# Patient Record
Sex: FEMALE | Race: WHITE | NOT HISPANIC OR LATINO | Employment: OTHER | URBAN - METROPOLITAN AREA
[De-identification: names, ages, dates, MRNs, and addresses within clinical notes are randomized per-mention and may not be internally consistent; named-entity substitution may affect disease eponyms.]

---

## 2019-11-22 ENCOUNTER — HOSPITAL ENCOUNTER (OUTPATIENT)
Dept: LABOR AND DELIVERY | Facility: HOSPITAL | Age: 24
Discharge: HOME | End: 2019-11-22
Attending: OBSTETRICS & GYNECOLOGY

## 2019-11-22 LAB
ALBUMIN SERPL-MCNC: 3.5 G/DL (ref 3.5–5)
ALBUMIN/GLOB SERPL: 1.1 {RATIO} (ref 1.4–2.6)
ALP SERPL-CCNC: 123 U/L (ref 42–98)
ALT SERPL-CCNC: 17 U/L (ref 10–40)
ANION GAP SERPL CALC-SCNC: 18 MMOL/L (ref 8–19)
AST SERPL-CCNC: 20 U/L (ref 15–50)
BASOPHILS # BLD AUTO: 0.02 10*3/UL (ref 0–0.2)
BASOPHILS NFR BLD AUTO: 0.2 % (ref 0–3)
BILIRUB SERPL-MCNC: <0.15 MG/DL (ref 0.2–1.3)
BUN SERPL-MCNC: 11 MG/DL (ref 5–25)
BUN/CREAT SERPL: 14 {RATIO} (ref 6–20)
CALCIUM SERPL-MCNC: 10 MG/DL (ref 8.7–10.4)
CHLORIDE SERPL-SCNC: 101 MMOL/L (ref 99–111)
CONV ABS IMM GRAN: 0.08 10*3/UL (ref 0–0.2)
CONV CO2: 19 MMOL/L (ref 22–32)
CONV CREATININE URINE, RANDOM: 110.1 MG/DL (ref 10–300)
CONV IMMATURE GRAN: 0.7 % (ref 0–1.8)
CONV PROTEIN TO CREATININE RATIO (RANDOM URINE): 0.13 {RATIO} (ref 0–0.1)
CONV TOTAL PROTEIN: 6.6 G/DL (ref 6.3–8.2)
CREAT UR-MCNC: 0.79 MG/DL (ref 0.5–0.9)
DEPRECATED RDW RBC AUTO: 39.9 FL (ref 36.4–46.3)
EOSINOPHIL # BLD AUTO: 0.07 10*3/UL (ref 0–0.7)
EOSINOPHIL # BLD AUTO: 0.6 % (ref 0–7)
ERYTHROCYTE [DISTWIDTH] IN BLOOD BY AUTOMATED COUNT: 12.9 % (ref 11.7–14.4)
GFR SERPLBLD BASED ON 1.73 SQ M-ARVRAT: >60 ML/MIN/{1.73_M2}
GLOBULIN UR ELPH-MCNC: 3.1 G/DL (ref 2–3.5)
GLUCOSE SERPL-MCNC: 98 MG/DL (ref 65–99)
HCT VFR BLD AUTO: 36.8 % (ref 37–47)
HGB BLD-MCNC: 12.6 G/DL (ref 12–16)
LYMPHOCYTES # BLD AUTO: 2.22 10*3/UL (ref 1–5)
LYMPHOCYTES NFR BLD AUTO: 20.1 % (ref 20–45)
MCH RBC QN AUTO: 29.6 PG (ref 27–31)
MCHC RBC AUTO-ENTMCNC: 34.2 G/DL (ref 33–37)
MCV RBC AUTO: 86.4 FL (ref 81–99)
MONOCYTES # BLD AUTO: 0.73 10*3/UL (ref 0.2–1.2)
MONOCYTES NFR BLD AUTO: 6.6 % (ref 3–10)
NEUTROPHILS # BLD AUTO: 7.92 10*3/UL (ref 2–8)
NEUTROPHILS NFR BLD AUTO: 71.8 % (ref 30–85)
NRBC CBCN: 0 % (ref 0–0.7)
OSMOLALITY SERPL CALC.SUM OF ELEC: 277 MOSM/KG (ref 273–304)
PLATELET # BLD AUTO: 261 10*3/UL (ref 130–400)
PMV BLD AUTO: 9.3 FL (ref 9.4–12.3)
POTASSIUM SERPL-SCNC: 4.4 MMOL/L (ref 3.5–5.3)
PROT UR-MCNC: 14 MG/DL
RBC # BLD AUTO: 4.26 10*6/UL (ref 4.2–5.4)
SODIUM SERPL-SCNC: 134 MMOL/L (ref 135–147)
WBC # BLD AUTO: 11.04 10*3/UL (ref 4.8–10.8)

## 2021-02-08 LAB
BACTERIA SPEC AEROBE CULT: NO GROWTH
C TRACH RRNA SPEC DONR QL NAA+PROBE: NEGATIVE
EXTERNAL ABO GROUPING: NORMAL
EXTERNAL ANTIBODY SCREEN: NORMAL
EXTERNAL HEMATOCRIT: 38 %
EXTERNAL HEMOGLOBIN: 13.1 G/DL
EXTERNAL HEPATITIS B SURFACE ANTIGEN: NEGATIVE
EXTERNAL PLATELET COUNT: 335 K/ΜL
EXTERNAL RH FACTOR: POSITIVE
EXTERNAL SYPHILIS RPR SCREEN: NEGATIVE
HCV AB S/CO SERPL IA: NEGATIVE
HIV 1+2 AB+HIV1 P24 AG SERPL QL IA: NEGATIVE
N GONORRHOEA DNA SPEC QL NAA+PROBE: NEGATIVE
RUBV IGG SERPL IA-ACNC: NORMAL

## 2021-03-17 ENCOUNTER — HOSPITAL ENCOUNTER (OUTPATIENT)
Dept: VACCINE CLINIC | Facility: HOSPITAL | Age: 26
Discharge: HOME | End: 2021-03-17
Attending: INTERNAL MEDICINE

## 2021-04-08 ENCOUNTER — HOSPITAL ENCOUNTER (OUTPATIENT)
Dept: VACCINE CLINIC | Facility: HOSPITAL | Age: 26
Discharge: HOME | End: 2021-04-08
Attending: INTERNAL MEDICINE

## 2021-06-21 LAB
EXTERNAL HEMATOCRIT: 36 %
EXTERNAL HEMOGLOBIN: 12.1 G/DL
GLUCOSE 1H P 100 G GLC PO SERPL-MCNC: 100 MG/DL (ref 74–180)

## 2021-08-02 ENCOUNTER — HOSPITAL ENCOUNTER (OUTPATIENT)
Facility: HOSPITAL | Age: 26
Discharge: HOME | End: 2021-08-02
Attending: OBSTETRICS & GYNECOLOGY | Admitting: OBSTETRICS & GYNECOLOGY

## 2021-08-02 VITALS
DIASTOLIC BLOOD PRESSURE: 73 MMHG | RESPIRATION RATE: 16 BRPM | HEART RATE: 83 BPM | BODY MASS INDEX: 28.47 KG/M2 | SYSTOLIC BLOOD PRESSURE: 105 MMHG | WEIGHT: 145 LBS | TEMPERATURE: 98.1 F | HEIGHT: 60 IN

## 2021-08-02 LAB
BILIRUB BLD-MCNC: NEGATIVE MG/DL
BLOODY SPECIMEN?: NO
CLARITY, POC: CLEAR
COLOR UR: YELLOW
FIBRONECTIN FETAL VAG QL: NEGATIVE
GLUCOSE UR STRIP-MCNC: NEGATIVE MG/DL
KETONES UR QL: NEGATIVE
LEUKOCYTE EST, POC: NEGATIVE
NITRITE UR-MCNC: NEGATIVE MG/ML
PH UR: 7 [PH] (ref 5–8)
PROT UR STRIP-MCNC: NEGATIVE MG/DL
RBC # UR STRIP: NEGATIVE /UL
SP GR UR: 1.02 (ref 1–1.03)
UROBILINOGEN UR QL: NORMAL

## 2021-08-02 PROCEDURE — 82731 ASSAY OF FETAL FIBRONECTIN: CPT | Performed by: OBSTETRICS & GYNECOLOGY

## 2021-08-02 PROCEDURE — G0463 HOSPITAL OUTPT CLINIC VISIT: HCPCS

## 2021-08-02 PROCEDURE — 81002 URINALYSIS NONAUTO W/O SCOPE: CPT | Performed by: OBSTETRICS & GYNECOLOGY

## 2021-08-02 PROCEDURE — 59025 FETAL NON-STRESS TEST: CPT

## 2021-08-02 RX ORDER — ONDANSETRON 2 MG/ML
4 INJECTION INTRAMUSCULAR; INTRAVENOUS EVERY 6 HOURS PRN
Status: DISCONTINUED | OUTPATIENT
Start: 2021-08-02 | End: 2021-08-02 | Stop reason: HOSPADM

## 2021-08-02 RX ORDER — CETIRIZINE HYDROCHLORIDE 10 MG/1
10 TABLET ORAL DAILY
COMMUNITY

## 2021-08-02 RX ORDER — ACETAMINOPHEN 325 MG/1
650 TABLET ORAL EVERY 6 HOURS PRN
Status: DISCONTINUED | OUTPATIENT
Start: 2021-08-02 | End: 2021-08-02 | Stop reason: HOSPADM

## 2021-08-02 NOTE — NURSING NOTE
Discharge instructions provided to patient. All questions answered, xuan tyson explained. Pt states she has no more questions at this time and verbalizes knowledge of discharge teaching.

## 2021-08-02 NOTE — SIGNIFICANT NOTE
08/02/21 1201   Vital Signs   Temp 98.1 °F (36.7 °C)   Temp src Oral   Heart Rate 86   Heart Rate Source Monitor;Apical   Resp 16   Resp Rate Source Visual   /64   BP Location Right arm   BP Method Automatic   Uterine Activity Assessment   Method external tocotransducer   Contraction Pattern irritability   Uterine Resting Tone soft by palpation   Uterine Tenderness No   Fetal Assessment   Fetal Movement active   Fetal HR Assessment Method external   Fetal HR (beats/min) 145   Fetal Heart Baseline Rate normal range   Fetal HR Variability moderate (amplitude range 6 to 25 bpm)   Fetal HR Accelerations greater than/equal to 15 bpm;lasting at least 15 seconds   Fetal HR Decelerations absent

## 2021-08-02 NOTE — NON STRESS TEST
"ROGELIO Gillespie   OB NST Note    2021   Name:  Marisol Ku  MRN: [id number]    Subjective:  26 y.o.  at 32w5d    Indication: Decreased FM    NST:   Baseline: 130  Variability:   Moderate/Normal (amplitude 6-25 bpm)  Accelerations: Present (32 weeks+) 15 x 15 bpm  Decelerations: Absent   Contractions:  Irritability    NST interpretation: reactive    Documented Vitals    21 1135 21 1150 21 1201 21 1215   BP:  109/64 111/64 107/66   Pulse: 107 107 86 96   Resp: 18      Temp: 98.5 °F (36.9 °C)      Weight: 65.8 kg (145 lb)      Height: 152.4 cm (60\")            Lab Results (last 24 hours)     Procedure Component Value Units Date/Time    Fetal Fibronectin - Vaginal Fluid, [[id number]] Collected: 21 1309    Specimen: Vaginal Fluid Updated: 21 1354     Fetal Fibronectin Negative     Bloody Specimen? No    Narrative:      INTERPRETATION                 Negative: <50 ng/mL  Positive: >or= 50 ng/mL  Invalid: Possible sample integrity compromised. Test was           repeated.  -----------------------------------------------------------   LIMITATIONS  A fFN Sample SHOULD NOT be sent to the lab if any of the  following conditions are present in symptomatic patients:  -Advanced Cervical Dilation (>3 cm)                         -Rupture of Amniotic Membranes  -Moderate or Gross Vaginal Bleeding (May Cause False Positive)             Cervical Exam: Closed, thick, and high per nursing exam    Assessment:  26 y.o.  AT 32w5d  Decreased FM   Uterine irritability    Plan:   OB Precautions, FKC, Keep office visit, Reassurance   The patient was not feeling her contractions.  But given the persistence of the uterine irritability and exam and fetal fibronectin were performed.  The cervix was closed and a fetal fibronectin was negative.  The patient had a reactive NST and was feeling fetal movements prior to discharge home.    Electronically signed by Morgan Noel MD, 21, " 12:57 PM EDT.

## 2021-08-26 ENCOUNTER — HOSPITAL ENCOUNTER (OUTPATIENT)
Facility: HOSPITAL | Age: 26
Discharge: HOME | End: 2021-08-26
Attending: OBSTETRICS & GYNECOLOGY | Admitting: OBSTETRICS & GYNECOLOGY

## 2021-08-26 ENCOUNTER — HOSPITAL ENCOUNTER (OUTPATIENT)
Facility: HOSPITAL | Age: 26
End: 2021-08-26
Attending: OBSTETRICS & GYNECOLOGY | Admitting: OBSTETRICS & GYNECOLOGY

## 2021-08-26 VITALS
BODY MASS INDEX: 28.66 KG/M2 | HEART RATE: 90 BPM | OXYGEN SATURATION: 100 % | SYSTOLIC BLOOD PRESSURE: 119 MMHG | WEIGHT: 146 LBS | RESPIRATION RATE: 18 BRPM | DIASTOLIC BLOOD PRESSURE: 67 MMHG | HEIGHT: 60 IN | TEMPERATURE: 98.3 F

## 2021-08-26 LAB
A1 MICROGLOB PLACENTAL VAG QL: NEGATIVE
BILIRUB BLD-MCNC: ABNORMAL MG/DL
CLARITY, POC: CLEAR
COLOR UR: ABNORMAL
GLUCOSE UR STRIP-MCNC: NEGATIVE MG/DL
KETONES UR QL: ABNORMAL
LEUKOCYTE EST, POC: NEGATIVE
NITRITE UR-MCNC: NEGATIVE MG/ML
PH UR: 6 [PH] (ref 5–8)
PROT UR STRIP-MCNC: ABNORMAL MG/DL
RBC # UR STRIP: NEGATIVE /UL
SP GR UR: 1.02 (ref 1–1.03)
UROBILINOGEN UR QL: NORMAL

## 2021-08-26 PROCEDURE — 59025 FETAL NON-STRESS TEST: CPT

## 2021-08-26 PROCEDURE — G0463 HOSPITAL OUTPT CLINIC VISIT: HCPCS

## 2021-08-26 PROCEDURE — 81002 URINALYSIS NONAUTO W/O SCOPE: CPT | Performed by: OBSTETRICS & GYNECOLOGY

## 2021-08-26 PROCEDURE — 84112 EVAL AMNIOTIC FLUID PROTEIN: CPT | Performed by: OBSTETRICS & GYNECOLOGY

## 2021-08-26 RX ORDER — SODIUM CHLORIDE, SODIUM LACTATE, POTASSIUM CHLORIDE, CALCIUM CHLORIDE 600; 310; 30; 20 MG/100ML; MG/100ML; MG/100ML; MG/100ML
999 INJECTION, SOLUTION INTRAVENOUS CONTINUOUS
Status: DISCONTINUED | OUTPATIENT
Start: 2021-08-26 | End: 2021-08-27 | Stop reason: HOSPADM

## 2021-08-26 RX ORDER — PRENATAL VIT NO.126/IRON/FOLIC 28MG-0.8MG
1 TABLET ORAL
Status: ON HOLD | COMMUNITY
End: 2021-09-15

## 2021-08-26 RX ORDER — ASPIRIN 81 MG/1
81 TABLET ORAL DAILY
COMMUNITY
End: 2021-09-14

## 2021-08-27 NOTE — SIGNIFICANT NOTE
08/26/21 2230   Nonstress Test   Reason for NST OB Triage   Acoustic Stimulator No   Uterine Irritability Yes   Contractions Irregular   Fetal Assessment   Fetal Movement active   Fetal HR Assessment Method external   Interpretation A   Nonstress Test Interpretation A Reactive

## 2021-08-27 NOTE — NURSING NOTE
give update via voalte, sve unchanged still 2/70/-1 posterior, ctx: have spaced out, of dip urine results, orders received to d/c home with PTL precautions.

## 2021-08-27 NOTE — NURSING NOTE
Pt. D/c home in stable condition, PTL precautions, home and follow up care instructions given, pt. Voiced understanding.

## 2021-08-27 NOTE — NON STRESS TEST
" Gillespie   OB NST Note    2021   Name:  Marisol Ku  MRN: [id number]    Subjective:  26 y.o.  at 36w1d    Indication: Contractions    NST:   Baseline: 140  Variability:   Moderate/Normal (amplitude 6-25 bpm)  Accelerations: Present (32 weeks+) 15 x 15 bpm  Decelerations: Absent   Contractions:  Regular    NST interpretation: reactive    Documented Vitals    21   BP: 128/73 117/75 118/73 119/67   Pulse: 114 95 87 90   Resp:  18   Temp: 98.3 °F (36.8 °C)      SpO2: 100%      Weight: 66.2 kg (146 lb)      Height: 152.4 cm (60\")            Lab Results (last 24 hours)     Procedure Component Value Units Date/Time    Rapid Assay For ROM - Amniotic Fluid, Amniotic Sac [[id number]]  (Normal) Collected: 21    Specimen: Amniotic Fluid from Amniotic Sac Updated: 21     Rupture of Membranes Negative    Narrative:      This test detects tiny amounts of amniotic fluid and is  approved for use at any gestational age. When there is   significant presence of blood on the swab, the test can   malfunction and is not recommended (possible false positive  results). In cases of only trace amounts of blood on the swab,  the test still functions properly and will not interfere with  the test results. In very rare cases when a sample is taken  12 hours or later after a rupture, a false negative result may  occur due to obstruction of the rupture by fetus or resealing  of the amniotic sac.    POC Urinalysis Dipstick [[id number]]  (Abnormal) Collected: 21    Specimen: Urine Updated: 21     Color Dark Yellow     Clarity, UA Clear     Glucose, UA Negative mg/dL      Bilirubin Small (1+)     Ketones, UA Trace     Specific Gravity  1.025     Blood, UA Negative     pH, Urine 6.0     Protein, POC Trace mg/dL      Urobilinogen, UA Normal     Leukocytes Negative     Nitrite, UA Negative           Cervical Exam: /-1 per nursing " exam. No cervical change after triage observation    Assessment:  26 y.o. [id number] at 36w1d  Contractions    Plan:   OB Precautions, FKC   The patient was discharged with a reactive NST, reassuring vitals, and no cervical change after several hours of observation. She was given return instructions and urged to keep her office appointment      Electronically signed by Morgan Noel MD, 08/26/21, 11:40 PM EDT.

## 2021-08-27 NOTE — NURSING NOTE
give report of  36.1 weeks presented with c/o ctx:, and leaking fluid since yesterday, of regular ctx: noted on monitor, amnisure obtained, sve /-1, fhr 135, b/p's wnl since admission, hx: preeclampsia with last pregnancy, orders received to obtain dip urine results, start IV, Give LR bolus, recheck cervix after fluids infused, notify with results.

## 2021-08-27 NOTE — NURSING NOTE
" 36.1 weeks presents with c/o \"cramping\" since this morning that has become more intense since 1800, states also a gush of fluid was noted yesterday morning, no bleeding noted, states +fm noted, states last delivery she was induced for preeclampsia at 38 weeks but b/p's have been wnl this pregnancy, pt. Denies ha,blurred vision, or epigastric pain, b/p's wnl, regular ctx: noted on monitor, palpate mild-mod., amnisure obtained, sve /-1 no blood or fluid noted on exam gloveDr. Noel to be notified.   "

## 2021-08-31 LAB — GP B STREP RRNA SPEC QL PROBE: NEGATIVE

## 2021-09-02 ENCOUNTER — HOSPITAL ENCOUNTER (OUTPATIENT)
Dept: ULTRASOUND IMAGING | Facility: HOSPITAL | Age: 26
Discharge: HOME | End: 2021-09-02
Admitting: OBSTETRICS & GYNECOLOGY

## 2021-09-02 ENCOUNTER — TRANSCRIBE ORDERS (OUTPATIENT)
Dept: ADMINISTRATIVE | Facility: HOSPITAL | Age: 26
End: 2021-09-02

## 2021-09-02 DIAGNOSIS — O26.843 UTERINE SIZE-DATE DISCREPANCY IN THIRD TRIMESTER: Primary | ICD-10-CM

## 2021-09-02 DIAGNOSIS — O26.843 UTERINE SIZE-DATE DISCREPANCY IN THIRD TRIMESTER: ICD-10-CM

## 2021-09-02 PROCEDURE — 76815 OB US LIMITED FETUS(S): CPT

## 2021-09-07 ENCOUNTER — ROUTINE PRENATAL (OUTPATIENT)
Dept: OBSTETRICS AND GYNECOLOGY | Facility: CLINIC | Age: 26
End: 2021-09-07

## 2021-09-07 VITALS — SYSTOLIC BLOOD PRESSURE: 122 MMHG | DIASTOLIC BLOOD PRESSURE: 76 MMHG | WEIGHT: 155 LBS | BODY MASS INDEX: 30.27 KG/M2

## 2021-09-07 DIAGNOSIS — Z87.59 HISTORY OF GESTATIONAL HYPERTENSION: ICD-10-CM

## 2021-09-07 DIAGNOSIS — Z34.80 SUPERVISION OF OTHER NORMAL PREGNANCY, ANTEPARTUM: ICD-10-CM

## 2021-09-07 DIAGNOSIS — Z34.90 PREGNANCY, UNSPECIFIED GESTATIONAL AGE: Primary | ICD-10-CM

## 2021-09-07 DIAGNOSIS — Z28.39 MATERNAL VARICELLA, NON-IMMUNE: ICD-10-CM

## 2021-09-07 DIAGNOSIS — O09.899 MATERNAL VARICELLA, NON-IMMUNE: ICD-10-CM

## 2021-09-07 LAB
GLUCOSE UR STRIP-MCNC: NEGATIVE MG/DL
PROT UR STRIP-MCNC: NEGATIVE MG/DL

## 2021-09-07 PROCEDURE — 99212 OFFICE O/P EST SF 10 MIN: CPT | Performed by: OBSTETRICS & GYNECOLOGY

## 2021-09-07 NOTE — PROGRESS NOTES
OB FOLLOW UP        CC: Scheduled OB routine FU     Subjective:   Patient has: Mild HA all weekend, better after rest and tylenol.  No symptoms now.  No vision change or edema.  No RUQ pain.        Objective:  /76   Wt 70.3 kg (155 lb)   LMP 12/16/2020   BMI 30.27 kg/m²   Uterine Size: size equals dates  FHT: 110-160 BPM  See OB flow for LE edema, cvx exam if performed, and Upro/Uglu    Assessment and Plan:  37w6d  Reassuring pregnancy progress.  Questions answered    Diagnoses and all orders for this visit:    1. Pregnancy, unspecified gestational age (Primary)  -     POC Urinalysis Dipstick    2. Supervision of other normal pregnancy, antepartum  Assessment & Plan:  GBS neg      3. History of gestational hypertension    4. Maternal varicella, non-immune      Counseling: OB precautions, leaking, VB, [place] vs PTL/Labor, FKC, HTN precautions, HA, vision change, RUQ/epigastric pain, edema    Return OK to cancel OV next week.  IOL R/B/A/SE/E pit +cvx cath reviewed.  Questions answered.        Virginia Rasheed, DO  09/07/2021

## 2021-09-10 ENCOUNTER — APPOINTMENT (OUTPATIENT)
Dept: LAB | Facility: HOSPITAL | Age: 26
End: 2021-09-10

## 2021-09-14 ENCOUNTER — PREP FOR SURGERY (OUTPATIENT)
Dept: OTHER | Facility: HOSPITAL | Age: 26
End: 2021-09-14

## 2021-09-14 DIAGNOSIS — Z34.90 ENCOUNTER FOR ELECTIVE INDUCTION OF LABOR: Primary | ICD-10-CM

## 2021-09-14 DIAGNOSIS — Z34.80 SUPERVISION OF OTHER NORMAL PREGNANCY, ANTEPARTUM: ICD-10-CM

## 2021-09-14 RX ORDER — FAMOTIDINE 20 MG/1
20 TABLET, FILM COATED ORAL 2 TIMES DAILY PRN
Status: CANCELLED | OUTPATIENT
Start: 2021-09-14

## 2021-09-14 RX ORDER — FAMOTIDINE 10 MG/ML
20 INJECTION, SOLUTION INTRAVENOUS 2 TIMES DAILY PRN
Status: CANCELLED | OUTPATIENT
Start: 2021-09-14

## 2021-09-14 RX ORDER — OXYTOCIN-SODIUM CHLORIDE 0.9% IV SOLN 30 UNIT/500ML 30-0.9/5 UT/ML-%
125 SOLUTION INTRAVENOUS ONCE
Status: CANCELLED | OUTPATIENT
Start: 2021-09-14 | End: 2021-09-14

## 2021-09-14 RX ORDER — PROMETHAZINE HYDROCHLORIDE 25 MG/1
25 TABLET ORAL EVERY 6 HOURS PRN
Status: CANCELLED | OUTPATIENT
Start: 2021-09-14

## 2021-09-14 RX ORDER — ACETAMINOPHEN 325 MG/1
650 TABLET ORAL EVERY 6 HOURS
Status: CANCELLED | OUTPATIENT
Start: 2021-09-14

## 2021-09-14 RX ORDER — ONDANSETRON 2 MG/ML
4 INJECTION INTRAMUSCULAR; INTRAVENOUS EVERY 6 HOURS PRN
Status: CANCELLED | OUTPATIENT
Start: 2021-09-14

## 2021-09-14 RX ORDER — METHYLERGONOVINE MALEATE 0.2 MG/ML
200 INJECTION INTRAVENOUS ONCE AS NEEDED
Status: CANCELLED | OUTPATIENT
Start: 2021-09-14

## 2021-09-14 RX ORDER — HYDROCODONE BITARTRATE AND ACETAMINOPHEN 5; 325 MG/1; MG/1
1 TABLET ORAL EVERY 4 HOURS PRN
Status: CANCELLED | OUTPATIENT
Start: 2021-09-14 | End: 2021-09-21

## 2021-09-14 RX ORDER — FAMOTIDINE 20 MG/1
20 TABLET, FILM COATED ORAL ONCE AS NEEDED
Status: CANCELLED | OUTPATIENT
Start: 2021-09-14

## 2021-09-14 RX ORDER — MAGNESIUM CARB/ALUMINUM HYDROX 105-160MG
30 TABLET,CHEWABLE ORAL ONCE
Status: CANCELLED | OUTPATIENT
Start: 2021-09-14 | End: 2021-09-14

## 2021-09-14 RX ORDER — ONDANSETRON 4 MG/1
4 TABLET, FILM COATED ORAL EVERY 6 HOURS PRN
Status: CANCELLED | OUTPATIENT
Start: 2021-09-14

## 2021-09-14 RX ORDER — TERBUTALINE SULFATE 1 MG/ML
0.25 INJECTION, SOLUTION SUBCUTANEOUS AS NEEDED
Status: CANCELLED | OUTPATIENT
Start: 2021-09-14

## 2021-09-14 RX ORDER — MORPHINE SULFATE 5 MG/ML
5 INJECTION, SOLUTION INTRAMUSCULAR; INTRAVENOUS
Status: CANCELLED | OUTPATIENT
Start: 2021-09-14

## 2021-09-14 RX ORDER — TRISODIUM CITRATE DIHYDRATE AND CITRIC ACID MONOHYDRATE 500; 334 MG/5ML; MG/5ML
30 SOLUTION ORAL ONCE AS NEEDED
Status: CANCELLED | OUTPATIENT
Start: 2021-09-14

## 2021-09-14 RX ORDER — CARBOPROST TROMETHAMINE 250 UG/ML
250 INJECTION, SOLUTION INTRAMUSCULAR ONCE AS NEEDED
Status: CANCELLED | OUTPATIENT
Start: 2021-09-14

## 2021-09-14 RX ORDER — FAMOTIDINE 10 MG/ML
20 INJECTION, SOLUTION INTRAVENOUS ONCE AS NEEDED
Status: CANCELLED | OUTPATIENT
Start: 2021-09-14

## 2021-09-14 RX ORDER — OXYTOCIN-SODIUM CHLORIDE 0.9% IV SOLN 30 UNIT/500ML 30-0.9/5 UT/ML-%
1-4 SOLUTION INTRAVENOUS
Status: CANCELLED | OUTPATIENT
Start: 2021-09-14

## 2021-09-14 RX ORDER — HYDROCODONE BITARTRATE AND ACETAMINOPHEN 10; 325 MG/1; MG/1
1 TABLET ORAL EVERY 4 HOURS PRN
Status: CANCELLED | OUTPATIENT
Start: 2021-09-14 | End: 2021-09-21

## 2021-09-14 RX ORDER — LIDOCAINE HYDROCHLORIDE 10 MG/ML
5 INJECTION, SOLUTION EPIDURAL; INFILTRATION; INTRACAUDAL; PERINEURAL AS NEEDED
Status: CANCELLED | OUTPATIENT
Start: 2021-09-14

## 2021-09-14 RX ORDER — ACETAMINOPHEN 325 MG/1
650 TABLET ORAL EVERY 4 HOURS PRN
Status: CANCELLED | OUTPATIENT
Start: 2021-09-14

## 2021-09-14 RX ORDER — MISOPROSTOL 200 UG/1
800 TABLET ORAL AS NEEDED
Status: CANCELLED | OUTPATIENT
Start: 2021-09-14

## 2021-09-14 RX ORDER — SODIUM CHLORIDE 0.9 % (FLUSH) 0.9 %
3 SYRINGE (ML) INJECTION EVERY 12 HOURS SCHEDULED
Status: CANCELLED | OUTPATIENT
Start: 2021-09-14

## 2021-09-14 RX ORDER — SODIUM CHLORIDE 0.9 % (FLUSH) 0.9 %
10 SYRINGE (ML) INJECTION AS NEEDED
Status: CANCELLED | OUTPATIENT
Start: 2021-09-14

## 2021-09-14 RX ORDER — METOCLOPRAMIDE HYDROCHLORIDE 5 MG/ML
10 INJECTION INTRAMUSCULAR; INTRAVENOUS ONCE AS NEEDED
Status: CANCELLED | OUTPATIENT
Start: 2021-09-14

## 2021-09-14 RX ORDER — SODIUM CHLORIDE, SODIUM LACTATE, POTASSIUM CHLORIDE, CALCIUM CHLORIDE 600; 310; 30; 20 MG/100ML; MG/100ML; MG/100ML; MG/100ML
150 INJECTION, SOLUTION INTRAVENOUS CONTINUOUS
Status: CANCELLED | OUTPATIENT
Start: 2021-09-14

## 2021-09-14 RX ORDER — PROMETHAZINE HYDROCHLORIDE 12.5 MG/1
12.5 TABLET ORAL EVERY 6 HOURS PRN
Status: CANCELLED | OUTPATIENT
Start: 2021-09-14

## 2021-09-14 RX ORDER — CEFAZOLIN SODIUM 2 G/100ML
2 INJECTION, SOLUTION INTRAVENOUS ONCE AS NEEDED
Status: CANCELLED | OUTPATIENT
Start: 2021-09-14 | End: 2021-09-18

## 2021-09-14 RX ORDER — IBUPROFEN 800 MG/1
800 TABLET ORAL EVERY 8 HOURS SCHEDULED
Status: CANCELLED | OUTPATIENT
Start: 2021-09-14

## 2021-09-14 NOTE — H&P (VIEW-ONLY)
OB HISTORY AND PHYSICAL      SUBJECTIVE:    26 y.o. female  currently at 38w6d [facility] complicated by:  None    CC/HPI:  Presents with Scheduled IOL.     ROS: No leaking fluid, No vaginal bleeding and Adequate FM.  Irreg cxns.    Past OB History:   OB History    Para Term  AB Living   2 1 1 0 0 1   SAB TAB Ectopic Molar Multiple Live Births   0 0 0 0 0 1      # Outcome Date GA Lbr Ta/2nd Weight Sex Delivery Anes PTL Lv   2 Current            1 Term 19   3175 g (7 lb) M Vaginal unsp  N VALERIE          Prenatal Labs:  Reviewed, see PNR, labs of note: Apos, RI, 1hr 100, GBS neg    PMHx:    Past Medical History:   Diagnosis Date   • History of gestational hypertension        Home Medications:  cetirizine, and prenatal vitamin    Allergies:  No Known Allergies    PSHx:    Past Surgical History:   Procedure Laterality Date   • CERVICAL BIOPSY  W/ LOOP ELECTRODE EXCISION      Dr. Chakraborty   • CHOLECYSTECTOMY     • WISDOM TOOTH EXTRACTION           Social History:    reports previous alcohol use. She reports that she does not use drugs.      Family History: Non contributory    Immunizations: See prenatal record for Tdap, Flu, Covid and/or other vaccinations    PHYSICAL EXAM:    Vitals: Reviewed       General- NAD, alert and oriented, appropriate  Psych- normal mood, good memory  CV- Regular rhythm, no murnurs  Resp- CTA to bases, no wheezes  Abdomen- Gravid, non tender  Fundus-  Non tender. Size: consistent with dates, EFW- 7 1/2 lbs   Pelvis-  Adequate   Cvx- 2cm / 60% / -3  Presentation- VTX  Ext/DTR: Trace edema    Fetal HR: Doptones 150  Contractions: Not cj    Lab/Imaging/Other: see EPIC, reviewed if available       ASSESSMENT:  38w6d  Scheduled IOL for tomorrow  GBS: Negative    PLAN:  Admit  Delivery: IOL, pitocin and cervical catheter    Plan of care NLT hospital course, R/B/A/potential SE, suspected length 12-24+hrs have been reviewed with patient and any family or friends  present, questions answered to her/his/their satisfaction.  Pt desires to proceed as above.    Counseling:The patient was counseled on the risks, benefits and alternatives of Induction.  Risks reviewed, but are not limited to: bleeding, transfusion, fetal intolerance,  (possibly emergent),  and uterine rupture.  She declines expectant management or  and desires induction.  All her questions have been answered to her satisfaction and she desires to proceed.        Electronically signed by Virginia Rasheed DO, 21, 9:07 AM EDT.

## 2021-09-14 NOTE — H&P
OB HISTORY AND PHYSICAL      SUBJECTIVE:    26 y.o. female  currently at 38w6d [facility] complicated by:  None    CC/HPI:  Presents with Scheduled IOL.     ROS: No leaking fluid, No vaginal bleeding and Adequate FM.  Irreg cxns.    Past OB History:   OB History    Para Term  AB Living   2 1 1 0 0 1   SAB TAB Ectopic Molar Multiple Live Births   0 0 0 0 0 1      # Outcome Date GA Lbr Ta/2nd Weight Sex Delivery Anes PTL Lv   2 Current            1 Term 19   3175 g (7 lb) M Vaginal unsp  N VALERIE          Prenatal Labs:  Reviewed, see PNR, labs of note: Apos, RI, 1hr 100, GBS neg    PMHx:    Past Medical History:   Diagnosis Date   • History of gestational hypertension        Home Medications:  cetirizine, and prenatal vitamin    Allergies:  No Known Allergies    PSHx:    Past Surgical History:   Procedure Laterality Date   • CERVICAL BIOPSY  W/ LOOP ELECTRODE EXCISION      Dr. Chakraborty   • CHOLECYSTECTOMY     • WISDOM TOOTH EXTRACTION           Social History:    reports previous alcohol use. She reports that she does not use drugs.      Family History: Non contributory    Immunizations: See prenatal record for Tdap, Flu, Covid and/or other vaccinations    PHYSICAL EXAM:    Vitals: Reviewed       General- NAD, alert and oriented, appropriate  Psych- normal mood, good memory  CV- Regular rhythm, no murnurs  Resp- CTA to bases, no wheezes  Abdomen- Gravid, non tender  Fundus-  Non tender. Size: consistent with dates, EFW- 7 1/2 lbs   Pelvis-  Adequate   Cvx- 2cm / 60% / -3  Presentation- VTX  Ext/DTR: Trace edema    Fetal HR: Doptones 150  Contractions: Not cj    Lab/Imaging/Other: see EPIC, reviewed if available       ASSESSMENT:  38w6d  Scheduled IOL for tomorrow  GBS: Negative    PLAN:  Admit  Delivery: IOL, pitocin and cervical catheter    Plan of care NLT hospital course, R/B/A/potential SE, suspected length 12-24+hrs have been reviewed with patient and any family or friends  present, questions answered to her/his/their satisfaction.  Pt desires to proceed as above.    Counseling:The patient was counseled on the risks, benefits and alternatives of Induction.  Risks reviewed, but are not limited to: bleeding, transfusion, fetal intolerance,  (possibly emergent),  and uterine rupture.  She declines expectant management or  and desires induction.  All her questions have been answered to her satisfaction and she desires to proceed.        Electronically signed by Virginia Rasheed DO, 21, 9:07 AM EDT.

## 2021-09-15 ENCOUNTER — HOSPITAL ENCOUNTER (OUTPATIENT)
Dept: LABOR AND DELIVERY | Facility: HOSPITAL | Age: 26
Discharge: HOME | End: 2021-09-15

## 2021-09-15 ENCOUNTER — ANESTHESIA (OUTPATIENT)
Dept: LABOR AND DELIVERY | Facility: HOSPITAL | Age: 26
End: 2021-09-15

## 2021-09-15 ENCOUNTER — HOSPITAL ENCOUNTER (INPATIENT)
Facility: HOSPITAL | Age: 26
LOS: 1 days | Discharge: HOME | End: 2021-09-16
Attending: OBSTETRICS & GYNECOLOGY | Admitting: OBSTETRICS & GYNECOLOGY

## 2021-09-15 ENCOUNTER — ANESTHESIA EVENT (OUTPATIENT)
Dept: LABOR AND DELIVERY | Facility: HOSPITAL | Age: 26
End: 2021-09-15

## 2021-09-15 DIAGNOSIS — Z34.80 SUPERVISION OF OTHER NORMAL PREGNANCY, ANTEPARTUM: ICD-10-CM

## 2021-09-15 PROBLEM — Z34.90 ENCOUNTER FOR INDUCTION OF LABOR: Status: ACTIVE | Noted: 2021-09-15

## 2021-09-15 LAB
ABO GROUP BLD: NORMAL
ABO GROUP BLD: NORMAL
BASE EXCESS BLDCOV CALC-SCNC: -3.7 MMOL/L
BLD GP AB SCN SERPL QL: NEGATIVE
DEPRECATED RDW RBC AUTO: 41.2 FL (ref 37–54)
ERYTHROCYTE [DISTWIDTH] IN BLOOD BY AUTOMATED COUNT: 13.3 % (ref 12.3–15.4)
HCO3 BLDCOV-SCNC: 21 MMOL/L
HCT VFR BLD AUTO: 34.2 % (ref 34–46.6)
HGB BLD-MCNC: 12.1 G/DL (ref 12–15.9)
MCH RBC QN AUTO: 30 PG (ref 26.6–33)
MCHC RBC AUTO-ENTMCNC: 35.4 G/DL (ref 31.5–35.7)
MCV RBC AUTO: 84.7 FL (ref 79–97)
PCO2 BLDCOV: 37.2 MM HG (ref 28–40)
PH BLDCOV: 7.37 PH UNITS (ref 7.31–7.37)
PLATELET # BLD AUTO: 228 10*3/MM3 (ref 140–450)
PMV BLD AUTO: 8.8 FL (ref 6–12)
PO2 BLDCOV: <40.5 MM HG (ref 21–31)
RBC # BLD AUTO: 4.04 10*6/MM3 (ref 3.77–5.28)
RH BLD: POSITIVE
RH BLD: POSITIVE
T&S EXPIRATION DATE: NORMAL
WBC # BLD AUTO: 12.12 10*3/MM3 (ref 3.4–10.8)

## 2021-09-15 PROCEDURE — 59410 OBSTETRICAL CARE: CPT | Performed by: OBSTETRICS & GYNECOLOGY

## 2021-09-15 PROCEDURE — 25010000002 FENTANYL CITRATE (PF) 50 MCG/ML SOLUTION

## 2021-09-15 PROCEDURE — C1755 CATHETER, INTRASPINAL: HCPCS | Performed by: ANESTHESIOLOGY

## 2021-09-15 PROCEDURE — 86850 RBC ANTIBODY SCREEN: CPT | Performed by: OBSTETRICS & GYNECOLOGY

## 2021-09-15 PROCEDURE — 82803 BLOOD GASES ANY COMBINATION: CPT | Performed by: OBSTETRICS & GYNECOLOGY

## 2021-09-15 PROCEDURE — 85027 COMPLETE CBC AUTOMATED: CPT | Performed by: OBSTETRICS & GYNECOLOGY

## 2021-09-15 PROCEDURE — 25010000002 ROPIVACAINE PER 1 MG

## 2021-09-15 PROCEDURE — 25010000002 FENTANYL CITRATE (PF) 50 MCG/ML SOLUTION: Performed by: ANESTHESIOLOGY

## 2021-09-15 PROCEDURE — 86900 BLOOD TYPING SEROLOGIC ABO: CPT | Performed by: OBSTETRICS & GYNECOLOGY

## 2021-09-15 PROCEDURE — 51702 INSERT TEMP BLADDER CATH: CPT

## 2021-09-15 PROCEDURE — 10907ZC DRAINAGE OF AMNIOTIC FLUID, THERAPEUTIC FROM PRODUCTS OF CONCEPTION, VIA NATURAL OR ARTIFICIAL OPENING: ICD-10-PCS | Performed by: OBSTETRICS & GYNECOLOGY

## 2021-09-15 PROCEDURE — 86901 BLOOD TYPING SEROLOGIC RH(D): CPT

## 2021-09-15 PROCEDURE — 86900 BLOOD TYPING SEROLOGIC ABO: CPT

## 2021-09-15 PROCEDURE — 86901 BLOOD TYPING SEROLOGIC RH(D): CPT | Performed by: OBSTETRICS & GYNECOLOGY

## 2021-09-15 PROCEDURE — 0HQ9XZZ REPAIR PERINEUM SKIN, EXTERNAL APPROACH: ICD-10-PCS | Performed by: OBSTETRICS & GYNECOLOGY

## 2021-09-15 RX ORDER — OXYTOCIN-SODIUM CHLORIDE 0.9% IV SOLN 30 UNIT/500ML 30-0.9/5 UT/ML-%
1-4 SOLUTION INTRAVENOUS
Status: DISCONTINUED | OUTPATIENT
Start: 2021-09-15 | End: 2021-09-15 | Stop reason: HOSPADM

## 2021-09-15 RX ORDER — ONDANSETRON 4 MG/1
4 TABLET, FILM COATED ORAL EVERY 6 HOURS PRN
Status: DISCONTINUED | OUTPATIENT
Start: 2021-09-15 | End: 2021-09-15 | Stop reason: HOSPADM

## 2021-09-15 RX ORDER — TERBUTALINE SULFATE 1 MG/ML
0.25 INJECTION, SOLUTION SUBCUTANEOUS AS NEEDED
Status: DISCONTINUED | OUTPATIENT
Start: 2021-09-15 | End: 2021-09-15 | Stop reason: HOSPADM

## 2021-09-15 RX ORDER — MISOPROSTOL 200 UG/1
800 TABLET ORAL AS NEEDED
Status: DISCONTINUED | OUTPATIENT
Start: 2021-09-15 | End: 2021-09-15 | Stop reason: HOSPADM

## 2021-09-15 RX ORDER — CARBOPROST TROMETHAMINE 250 UG/ML
250 INJECTION, SOLUTION INTRAMUSCULAR ONCE AS NEEDED
Status: DISCONTINUED | OUTPATIENT
Start: 2021-09-15 | End: 2021-09-15 | Stop reason: HOSPADM

## 2021-09-15 RX ORDER — SODIUM CHLORIDE 0.9 % (FLUSH) 0.9 %
1-10 SYRINGE (ML) INJECTION AS NEEDED
Status: DISCONTINUED | OUTPATIENT
Start: 2021-09-15 | End: 2021-09-16 | Stop reason: HOSPADM

## 2021-09-15 RX ORDER — FAMOTIDINE 10 MG/ML
20 INJECTION, SOLUTION INTRAVENOUS 2 TIMES DAILY PRN
Status: DISCONTINUED | OUTPATIENT
Start: 2021-09-15 | End: 2021-09-15 | Stop reason: HOSPADM

## 2021-09-15 RX ORDER — ACETAMINOPHEN 325 MG/1
650 TABLET ORAL EVERY 4 HOURS PRN
Status: DISCONTINUED | OUTPATIENT
Start: 2021-09-15 | End: 2021-09-15 | Stop reason: HOSPADM

## 2021-09-15 RX ORDER — LIDOCAINE HYDROCHLORIDE 10 MG/ML
5 INJECTION, SOLUTION EPIDURAL; INFILTRATION; INTRACAUDAL; PERINEURAL AS NEEDED
Status: DISCONTINUED | OUTPATIENT
Start: 2021-09-15 | End: 2021-09-15 | Stop reason: HOSPADM

## 2021-09-15 RX ORDER — ACETAMINOPHEN 325 MG/1
650 TABLET ORAL EVERY 8 HOURS
Status: DISCONTINUED | OUTPATIENT
Start: 2021-09-15 | End: 2021-09-16 | Stop reason: HOSPADM

## 2021-09-15 RX ORDER — IBUPROFEN 800 MG/1
800 TABLET ORAL EVERY 8 HOURS SCHEDULED
Status: DISCONTINUED | OUTPATIENT
Start: 2021-09-15 | End: 2021-09-16 | Stop reason: HOSPADM

## 2021-09-15 RX ORDER — DOCUSATE SODIUM 100 MG/1
100 CAPSULE, LIQUID FILLED ORAL DAILY
Status: DISCONTINUED | OUTPATIENT
Start: 2021-09-15 | End: 2021-09-16 | Stop reason: HOSPADM

## 2021-09-15 RX ORDER — OXYTOCIN-SODIUM CHLORIDE 0.9% IV SOLN 30 UNIT/500ML 30-0.9/5 UT/ML-%
125 SOLUTION INTRAVENOUS ONCE
Status: COMPLETED | OUTPATIENT
Start: 2021-09-15 | End: 2021-09-15

## 2021-09-15 RX ORDER — LIDOCAINE HYDROCHLORIDE AND EPINEPHRINE 15; 5 MG/ML; UG/ML
INJECTION, SOLUTION EPIDURAL
Status: COMPLETED | OUTPATIENT
Start: 2021-09-15 | End: 2021-09-15

## 2021-09-15 RX ORDER — MORPHINE SULFATE 5 MG/ML
5 INJECTION, SOLUTION INTRAMUSCULAR; INTRAVENOUS
Status: DISCONTINUED | OUTPATIENT
Start: 2021-09-15 | End: 2021-09-15 | Stop reason: HOSPADM

## 2021-09-15 RX ORDER — BISACODYL 10 MG
10 SUPPOSITORY, RECTAL RECTAL DAILY PRN
Status: DISCONTINUED | OUTPATIENT
Start: 2021-09-16 | End: 2021-09-16 | Stop reason: HOSPADM

## 2021-09-15 RX ORDER — FAMOTIDINE 20 MG/1
20 TABLET, FILM COATED ORAL 2 TIMES DAILY PRN
Status: DISCONTINUED | OUTPATIENT
Start: 2021-09-15 | End: 2021-09-15 | Stop reason: HOSPADM

## 2021-09-15 RX ORDER — EPHEDRINE SULFATE 50 MG/ML
5 INJECTION, SOLUTION INTRAVENOUS
Status: DISCONTINUED | OUTPATIENT
Start: 2021-09-15 | End: 2021-09-15 | Stop reason: HOSPADM

## 2021-09-15 RX ORDER — SODIUM CHLORIDE 0.9 % (FLUSH) 0.9 %
3 SYRINGE (ML) INJECTION EVERY 12 HOURS SCHEDULED
Status: DISCONTINUED | OUTPATIENT
Start: 2021-09-15 | End: 2021-09-15 | Stop reason: HOSPADM

## 2021-09-15 RX ORDER — SODIUM CHLORIDE, SODIUM LACTATE, POTASSIUM CHLORIDE, CALCIUM CHLORIDE 600; 310; 30; 20 MG/100ML; MG/100ML; MG/100ML; MG/100ML
150 INJECTION, SOLUTION INTRAVENOUS CONTINUOUS
Status: DISCONTINUED | OUTPATIENT
Start: 2021-09-15 | End: 2021-09-15

## 2021-09-15 RX ORDER — PROMETHAZINE HYDROCHLORIDE 12.5 MG/1
12.5 TABLET ORAL EVERY 4 HOURS PRN
Status: DISCONTINUED | OUTPATIENT
Start: 2021-09-15 | End: 2021-09-16 | Stop reason: HOSPADM

## 2021-09-15 RX ORDER — BUPIVACAINE HYDROCHLORIDE 2.5 MG/ML
INJECTION, SOLUTION EPIDURAL; INFILTRATION; INTRACAUDAL
Status: DISPENSED
Start: 2021-09-15 | End: 2021-09-15

## 2021-09-15 RX ORDER — ONDANSETRON 4 MG/1
4 TABLET, FILM COATED ORAL EVERY 8 HOURS PRN
Status: DISCONTINUED | OUTPATIENT
Start: 2021-09-15 | End: 2021-09-16 | Stop reason: HOSPADM

## 2021-09-15 RX ORDER — MAGNESIUM CARB/ALUMINUM HYDROX 105-160MG
30 TABLET,CHEWABLE ORAL ONCE
Status: DISCONTINUED | OUTPATIENT
Start: 2021-09-15 | End: 2021-09-15 | Stop reason: HOSPADM

## 2021-09-15 RX ORDER — CALCIUM CARBONATE 200(500)MG
2 TABLET,CHEWABLE ORAL 3 TIMES DAILY PRN
Status: DISCONTINUED | OUTPATIENT
Start: 2021-09-15 | End: 2021-09-16 | Stop reason: HOSPADM

## 2021-09-15 RX ORDER — ONDANSETRON 2 MG/ML
4 INJECTION INTRAMUSCULAR; INTRAVENOUS EVERY 6 HOURS PRN
Status: DISCONTINUED | OUTPATIENT
Start: 2021-09-15 | End: 2021-09-15 | Stop reason: HOSPADM

## 2021-09-15 RX ORDER — PROMETHAZINE HYDROCHLORIDE 12.5 MG/1
12.5 TABLET ORAL EVERY 6 HOURS PRN
Status: DISCONTINUED | OUTPATIENT
Start: 2021-09-15 | End: 2021-09-15 | Stop reason: HOSPADM

## 2021-09-15 RX ORDER — SODIUM CHLORIDE, SODIUM LACTATE, POTASSIUM CHLORIDE, CALCIUM CHLORIDE 600; 310; 30; 20 MG/100ML; MG/100ML; MG/100ML; MG/100ML
150 INJECTION, SOLUTION INTRAVENOUS CONTINUOUS
Status: DISCONTINUED | OUTPATIENT
Start: 2021-09-15 | End: 2021-09-16 | Stop reason: HOSPADM

## 2021-09-15 RX ORDER — FENTANYL CITRATE 50 UG/ML
INJECTION, SOLUTION INTRAMUSCULAR; INTRAVENOUS
Status: COMPLETED | OUTPATIENT
Start: 2021-09-15 | End: 2021-09-15

## 2021-09-15 RX ORDER — FENTANYL CITRATE 50 UG/ML
INJECTION, SOLUTION INTRAMUSCULAR; INTRAVENOUS
Status: COMPLETED
Start: 2021-09-15 | End: 2021-09-15

## 2021-09-15 RX ORDER — METHYLERGONOVINE MALEATE 0.2 MG/ML
200 INJECTION INTRAVENOUS ONCE AS NEEDED
Status: DISCONTINUED | OUTPATIENT
Start: 2021-09-15 | End: 2021-09-16 | Stop reason: HOSPADM

## 2021-09-15 RX ORDER — FAMOTIDINE 20 MG/1
20 TABLET, FILM COATED ORAL ONCE AS NEEDED
Status: DISCONTINUED | OUTPATIENT
Start: 2021-09-15 | End: 2021-09-15 | Stop reason: HOSPADM

## 2021-09-15 RX ORDER — IBUPROFEN 800 MG/1
800 TABLET ORAL EVERY 8 HOURS SCHEDULED
Status: DISCONTINUED | OUTPATIENT
Start: 2021-09-15 | End: 2021-09-15 | Stop reason: HOSPADM

## 2021-09-15 RX ORDER — HYDROCODONE BITARTRATE AND ACETAMINOPHEN 10; 325 MG/1; MG/1
1 TABLET ORAL EVERY 4 HOURS PRN
Status: DISCONTINUED | OUTPATIENT
Start: 2021-09-15 | End: 2021-09-16 | Stop reason: HOSPADM

## 2021-09-15 RX ORDER — SODIUM CHLORIDE 0.9 % (FLUSH) 0.9 %
10 SYRINGE (ML) INJECTION AS NEEDED
Status: DISCONTINUED | OUTPATIENT
Start: 2021-09-15 | End: 2021-09-15 | Stop reason: HOSPADM

## 2021-09-15 RX ORDER — TRISODIUM CITRATE DIHYDRATE AND CITRIC ACID MONOHYDRATE 500; 334 MG/5ML; MG/5ML
30 SOLUTION ORAL ONCE AS NEEDED
Status: DISCONTINUED | OUTPATIENT
Start: 2021-09-15 | End: 2021-09-15 | Stop reason: HOSPADM

## 2021-09-15 RX ORDER — HYDROCODONE BITARTRATE AND ACETAMINOPHEN 5; 325 MG/1; MG/1
1 TABLET ORAL EVERY 4 HOURS PRN
Status: DISCONTINUED | OUTPATIENT
Start: 2021-09-15 | End: 2021-09-15 | Stop reason: HOSPADM

## 2021-09-15 RX ORDER — FAMOTIDINE 10 MG/ML
20 INJECTION, SOLUTION INTRAVENOUS ONCE AS NEEDED
Status: DISCONTINUED | OUTPATIENT
Start: 2021-09-15 | End: 2021-09-15 | Stop reason: HOSPADM

## 2021-09-15 RX ORDER — MISOPROSTOL 200 UG/1
600 TABLET ORAL ONCE AS NEEDED
Status: DISCONTINUED | OUTPATIENT
Start: 2021-09-15 | End: 2021-09-16 | Stop reason: HOSPADM

## 2021-09-15 RX ORDER — METHYLERGONOVINE MALEATE 0.2 MG/ML
200 INJECTION INTRAVENOUS ONCE AS NEEDED
Status: DISCONTINUED | OUTPATIENT
Start: 2021-09-15 | End: 2021-09-15 | Stop reason: HOSPADM

## 2021-09-15 RX ORDER — METOCLOPRAMIDE HYDROCHLORIDE 5 MG/ML
10 INJECTION INTRAMUSCULAR; INTRAVENOUS ONCE AS NEEDED
Status: DISCONTINUED | OUTPATIENT
Start: 2021-09-15 | End: 2021-09-15 | Stop reason: HOSPADM

## 2021-09-15 RX ORDER — PROMETHAZINE HYDROCHLORIDE 25 MG/1
25 TABLET ORAL EVERY 6 HOURS PRN
Status: DISCONTINUED | OUTPATIENT
Start: 2021-09-15 | End: 2021-09-15 | Stop reason: HOSPADM

## 2021-09-15 RX ORDER — CARBOPROST TROMETHAMINE 250 UG/ML
250 INJECTION, SOLUTION INTRAMUSCULAR ONCE AS NEEDED
Status: DISCONTINUED | OUTPATIENT
Start: 2021-09-15 | End: 2021-09-16 | Stop reason: HOSPADM

## 2021-09-15 RX ORDER — ACETAMINOPHEN 325 MG/1
650 TABLET ORAL EVERY 6 HOURS
Status: DISCONTINUED | OUTPATIENT
Start: 2021-09-15 | End: 2021-09-15 | Stop reason: HOSPADM

## 2021-09-15 RX ORDER — HYDROCODONE BITARTRATE AND ACETAMINOPHEN 5; 325 MG/1; MG/1
1 TABLET ORAL EVERY 4 HOURS PRN
Status: DISCONTINUED | OUTPATIENT
Start: 2021-09-15 | End: 2021-09-16 | Stop reason: HOSPADM

## 2021-09-15 RX ORDER — HYDROCODONE BITARTRATE AND ACETAMINOPHEN 10; 325 MG/1; MG/1
1 TABLET ORAL EVERY 4 HOURS PRN
Status: DISCONTINUED | OUTPATIENT
Start: 2021-09-15 | End: 2021-09-15 | Stop reason: HOSPADM

## 2021-09-15 RX ORDER — CEFAZOLIN SODIUM 2 G/100ML
2 INJECTION, SOLUTION INTRAVENOUS ONCE AS NEEDED
Status: DISCONTINUED | OUTPATIENT
Start: 2021-09-15 | End: 2021-09-15

## 2021-09-15 RX ADMIN — OXYTOCIN 125 ML/HR: 10 INJECTION, SOLUTION INTRAMUSCULAR; INTRAVENOUS at 15:34

## 2021-09-15 RX ADMIN — DOCUSATE SODIUM 100 MG: 100 CAPSULE, LIQUID FILLED ORAL at 19:18

## 2021-09-15 RX ADMIN — ACETAMINOPHEN 650 MG: 325 TABLET ORAL at 19:18

## 2021-09-15 RX ADMIN — IBUPROFEN 800 MG: 800 TABLET, FILM COATED ORAL at 21:05

## 2021-09-15 RX ADMIN — OXYTOCIN 1 MILLI-UNITS/MIN: 10 INJECTION, SOLUTION INTRAMUSCULAR; INTRAVENOUS at 08:20

## 2021-09-15 RX ADMIN — FENTANYL CITRATE 100 MCG: 50 INJECTION INTRAMUSCULAR; INTRAVENOUS at 11:22

## 2021-09-15 RX ADMIN — IBUPROFEN 800 MG: 800 TABLET, FILM COATED ORAL at 15:34

## 2021-09-15 RX ADMIN — SODIUM CHLORIDE, POTASSIUM CHLORIDE, SODIUM LACTATE AND CALCIUM CHLORIDE 150 ML/HR: 600; 310; 30; 20 INJECTION, SOLUTION INTRAVENOUS at 07:45

## 2021-09-15 RX ADMIN — LIDOCAINE HYDROCHLORIDE AND EPINEPHRINE 5 ML: 15; 5 INJECTION, SOLUTION EPIDURAL at 11:22

## 2021-09-15 RX ADMIN — SODIUM CHLORIDE, POTASSIUM CHLORIDE, SODIUM LACTATE AND CALCIUM CHLORIDE 150 ML/HR: 600; 310; 30; 20 INJECTION, SOLUTION INTRAVENOUS at 10:35

## 2021-09-15 RX ADMIN — ACETAMINOPHEN 650 MG: 325 TABLET ORAL at 15:34

## 2021-09-15 NOTE — PLAN OF CARE
Problem: Adult Inpatient Plan of Care  Goal: Plan of Care Review  Outcome: Ongoing, Progressing  Goal: Patient-Specific Goal (Individualized)  Outcome: Ongoing, Progressing  Goal: Absence of Hospital-Acquired Illness or Injury  Outcome: Ongoing, Progressing  Goal: Optimal Comfort and Wellbeing  Outcome: Ongoing, Progressing  Goal: Readiness for Transition of Care  Outcome: Ongoing, Progressing  Intervention: Mutually Develop Transition Plan  Recent Flowsheet Documentation  Taken 9/15/2021 0800 by Meredith Augustine RN  Equipment Currently Used at Home: none  Transportation Anticipated: family or friend will provide  Transportation Concerns: car, none  Patient/Family Anticipated Services at Transition: none  Patient/Family Anticipates Transition to: home     Problem: Bleeding (Labor)  Goal: Hemostasis  Outcome: Ongoing, Progressing     Problem: Change in Fetal Wellbeing (Labor)  Goal: Stable Fetal Wellbeing  Outcome: Ongoing, Progressing     Problem: Delayed Labor Progression (Labor)  Goal: Effective Progression to Delivery  Outcome: Ongoing, Progressing     Problem: Infection (Labor)  Goal: Absence of Infection Signs and Symptoms  Outcome: Ongoing, Progressing     Problem: Labor Pain (Labor)  Goal: Acceptable Pain Control  Outcome: Ongoing, Progressing     Problem: Uterine Tachysystole (Labor)  Goal: Normal Uterine Contraction Pattern  Outcome: Ongoing, Progressing   Goal Outcome Evaluation:

## 2021-09-15 NOTE — ANESTHESIA PROCEDURE NOTES
Labor Epidural      Patient reassessed immediately prior to procedure    Patient location during procedure: OB  Preanesthetic Checklist  Completed: patient identified, IV checked, risks and benefits discussed, surgical consent, monitors and equipment checked, pre-op evaluation and timeout performed  Prep:  Pt Position:sitting  Sterile Tech:cap, gloves, sterile barrier, mask and gown  Prep:chlorhexidine gluconate and isopropyl alcohol  Monitoring:blood pressure monitoring, continuous pulse oximetry and EKG  Epidural Block Procedure:  Approach:midline  Guidance:landmark technique and palpation technique  Location:L4-L5  Needle Type:Tuohy  Needle Gauge:17 G  Loss of Resistance: 12cm  Paresthesia: none  Aspiration:negative  Test Dose:negative  Test dose medication: fentaNYL citrate (PF) (SUBLIMAZE) injection, 100 mcg  lidocaine 1.5%-EPINEPHrine 1:200,000 (XYLOCAINE W/EPI) injection, 5 mL  Number of Attempts: 1  Post Assessment:  Dressing:occlusive dressing applied and secured with tape  Pt Tolerance:patient tolerated the procedure well with no apparent complications  Complications:no

## 2021-09-15 NOTE — ANESTHESIA PREPROCEDURE EVALUATION
Anesthesia Evaluation     Patient summary reviewed and Nursing notes reviewed   no history of anesthetic complications:  NPO Solid Status: > 8 hours  NPO Liquid Status: > 2 hours           Airway   Mallampati: II  TM distance: >3 FB  Neck ROM: full  No difficulty expected  Dental      Pulmonary - negative pulmonary ROS and normal exam    breath sounds clear to auscultation  Cardiovascular - normal exam  Exercise tolerance: good (4-7 METS)    Rhythm: regular    (+) hypertension,       Neuro/Psych- negative ROS  GI/Hepatic/Renal/Endo - negative ROS     Musculoskeletal (-) negative ROS    Abdominal    Substance History - negative use     OB/GYN    (+) Pregnant, Preeclampsia, pregnancy induced hypertension        Other - negative ROS                       Anesthesia Plan    ASA 2     epidural   (Patient understands anesthesia not responsible for dental damage.)  intravenous induction     Anesthetic plan, all risks, benefits, and alternatives have been provided, discussed and informed consent has been obtained with: patient.  Use of blood products discussed with patient .

## 2021-09-15 NOTE — DISCHARGE SUMMARY
OB Discharge Summary        Admit Date:  9/15/2021  Date of Delivery: 9/15/2021   Discharge Date: 2021    Reason for Admission: Scheduled IOL    Final Diagnosis:  IOL 39+0   9/15   s/p covid vaccine  Bottle    To do at FU: VZV vaccination with PCP, routine postpartum    Antepartum:  Prenatal care is complicated by:  None    Intrapartum/Delivery:  OB Surgeon:  Virginia Rasheed DO  Anesthesia: Epidural  Delivery Type:   Perineum: 1st degree laceration  Feeding method: Bottle    Infant: female  infant; Catalino Nakia    Weight: 3190 g (7 lb 0.5 oz)      APGARS: 8  @ 1 minute / 9  @ 5 minutes    Hospital Course/Significant Findings:  Patient arrived for scheduled IOL. Pushed w only 3 contractions.  Uncomplicated  and PP.    Discharge:         Discharge Medications        New Medications        Instructions Start Date   acetaminophen 325 MG tablet  Commonly known as: Tylenol   650 mg, Oral, Every 6 Hours PRN      ibuprofen 800 MG tablet  Commonly known as: ADVIL,MOTRIN   800 mg, Oral, Every 8 Hours PRN             Continue These Medications        Instructions Start Date   cetirizine 10 MG tablet  Commonly known as: zyrTEC   10 mg, Oral, Daily                 Disposition: Home  Diet: Regular    Pelvic Rest: 6 weeks    Condition at discharge: Good    Follow up with: Virginia Rasheed DO or provider of her choice    Follow up in: 5 weeks    Complications: None      Signature: Electronically signed by Virginia Rasheed DO, 21, 6:54 AM EDT.

## 2021-09-15 NOTE — L&D DELIVERY NOTE
VAGINAL DELIVERY NOTE          Date: 9/15/2021   Time:  1:45 PM   GA: 39w0d    Infant: female  infant   3190 g (7 lb 0.5 oz)     APGARS: 8  @ 1 minute / 9  @ 5 minutes    Delivery: The patient progressed to complete, complete and pushed for 3 contractions to deliver a viable infant in cephalic presentation weighing the above weight and above Apgars.  There was no nuchal cord.   The mouth and nares were bulb suctioned on the perineum.  The right anterior and left posterior shoulders were easily delivered without traction.  The remainder of the body delivered.  Cord wrapped loosely around both ankles.  Delayed cord clamping for 60 seconds.  The cord was then clamped and cut.  Infant was placed on the maternal chest for recovery.  The placenta delivered with the assistance of fundal massage and maternal pushing efforts.      On full evaluation of the perineum, vagina, cervix and rectum a small vaginal abrasion/laceration noted.  First-degree vaginal midline.  It was repaired with 2 figure-of-eight stitches 4-0 vicryl for approximation.  Hemostasis assured.  External anal sphincter was intact.         Counts were correct.      Perineum: 1st degree laceration    EBL: 200 ml    Complications: None        Electronically signed by Virginia Rasheed DO, 09/15/21, 2:22 PM EDT.

## 2021-09-15 NOTE — PROGRESS NOTES
OB Intrapartum Note    Subjective: No complaints    Objective:  Baseline: Normal 110-160 bpm  Variability:   Moderate/Normal (amplitude 6-25 bpm)  Accelerations: Present (32 weeks+) 15 x 15 bpm  Decelerations: None  Contractions:  Irregular    Cervical exam:    Dilation: 3-4cm    Effacement: 50%    Station: -3    Impression:    Category I  Reassuring fetus, IOL started, cvx cath placed    Plan:   Continue pitocin, Continue to monitor, Active labor positioning and I have discussed with patient and family (if present) the current plan to include, but NLT appropriate expectations, to include possible length of time before delivery/hospital stay.  All questions have been answered to their satisfaction and they desire to proceed as noted.        Electronically signed by Virginia Rasheed DO, 09/15/21, 9:14 AM EDT.

## 2021-09-15 NOTE — PROGRESS NOTES
OB Intrapartum Note    Subjective: No complaints, Pain controlled, Comfortable with epidural    Objective:  Baseline: Normal 110-160 bpm  Variability:   Moderate/Normal (amplitude 6-25 bpm)  Accelerations: Present (32 weeks+) 15 x 15 bpm  Decelerations: None  Contractions:  Regular    Cervical exam:    Dilation: 6-7cm    Effacement: 70%    Station: -1    Impression:    Category I  Reassuring fetus, Adequate progress, AROM, Clear fluid, Pain controlled    Plan:   Continue pitocin and Continue to monitor        Electronically signed by Virginia Rasheed DO, 09/15/21, 12:09 PM EDT.

## 2021-09-16 VITALS
BODY MASS INDEX: 30.43 KG/M2 | TEMPERATURE: 97.3 F | WEIGHT: 155 LBS | RESPIRATION RATE: 18 BRPM | SYSTOLIC BLOOD PRESSURE: 115 MMHG | DIASTOLIC BLOOD PRESSURE: 73 MMHG | HEIGHT: 60 IN | HEART RATE: 88 BPM | OXYGEN SATURATION: 100 %

## 2021-09-16 RX ORDER — IBUPROFEN 800 MG/1
800 TABLET ORAL EVERY 8 HOURS PRN
Qty: 30 TABLET | Refills: 1 | Status: SHIPPED | OUTPATIENT
Start: 2021-09-16 | End: 2021-09-26

## 2021-09-16 RX ORDER — ACETAMINOPHEN 325 MG/1
650 TABLET ORAL EVERY 6 HOURS PRN
Qty: 30 TABLET | Refills: 1 | Status: SHIPPED | OUTPATIENT
Start: 2021-09-16 | End: 2021-09-26

## 2021-09-16 RX ADMIN — ACETAMINOPHEN 650 MG: 325 TABLET ORAL at 09:20

## 2021-09-16 RX ADMIN — IBUPROFEN 800 MG: 800 TABLET, FILM COATED ORAL at 13:17

## 2021-09-16 RX ADMIN — DOCUSATE SODIUM 100 MG: 100 CAPSULE, LIQUID FILLED ORAL at 09:20

## 2021-09-16 RX ADMIN — WITCH HAZEL 1 PAD: 500 SOLUTION RECTAL; TOPICAL at 13:29

## 2021-09-16 RX ADMIN — ACETAMINOPHEN 650 MG: 325 TABLET ORAL at 01:52

## 2021-09-16 RX ADMIN — IBUPROFEN 800 MG: 800 TABLET, FILM COATED ORAL at 05:59

## 2021-09-16 NOTE — PROGRESS NOTES
PostPartum/PostOp PROGRESS NOTE        Subjective:  • Patient has no complaints  • Ambulating  • Urinating spontaneously  • Lochia decreasing, no bleeding concerns      Objective:     Vitals: reviewed  General- NAD, alert and oriented, appropriate  Psych- Normal mood, good memory  CV/Resp- Not examined  Abdomen- Fundus non tender  Ext/DTRs- No edema    Lab Results   Component Value Date    WBC 12.12 (H) 09/15/2021    HGB 12.1 09/15/2021    HCT 34.2 09/15/2021    MCV 84.7 09/15/2021     09/15/2021      Lab Results   Component Value Date    BUN 11 11/22/2019    CREATININE 0.79 11/22/2019    BCR 14 11/22/2019    K 4.4 11/22/2019    CO2 19 (L) 11/22/2019    CALCIUM 10.0 11/22/2019    ALBUMIN 3.5 11/22/2019    LABIL2 1.1 (L) 11/22/2019    AST 20 11/22/2019    ALT 17 11/22/2019          Assessment:    Post-partum/postop Day:  1  Desires DC home    Plan:   • Routine postpartum/postop care  • Discharge home  • DC meds reviewed  • Follow up scheduled  • PP/PO precautions given              Electronically signed by Virginia Rasheed DO, 09/16/21, 6:51 AM EDT.

## 2021-09-16 NOTE — ANESTHESIA POSTPROCEDURE EVALUATION
Patient: Marisol Ku    Procedure Summary     Date: 09/15/21 Room / Location:     Anesthesia Start: 1046 Anesthesia Stop: 1345    Procedure: LABOR ANALGESIA Diagnosis:     Scheduled Providers:  Provider: Roberto Muñoz MD    Anesthesia Type: epidural ASA Status: 2          Anesthesia Type: epidural    Vitals  Vitals Value Taken Time   BP 92/52 09/16/21 0535   Temp 36.6 °C (97.9 °F) 09/16/21 0535   Pulse 75 09/16/21 0535   Resp 16 09/16/21 0535   SpO2 100 % 09/15/21 1211   Vitals shown include unvalidated device data.        Post Anesthesia Care and Evaluation    Patient location during evaluation: bedside  Patient participation: complete - patient participated  Level of consciousness: awake  Pain management: adequate  Airway patency: patent  Anesthetic complications: No anesthetic complications  PONV Status: none  Cardiovascular status: acceptable and stable  Respiratory status: acceptable and room air  Hydration status: acceptable  Post Neuraxial Block status: Motor and sensory function returned to baseline and No signs or symptoms of PDPH  Comments: An Anesthesiologist personally participated in the most demanding procedures (including induction and emergence if applicable) in the anesthesia plan, monitored the course of anesthesia administration at frequent intervals and remained physically present and available for immediate diagnosis and treatment of emergencies.

## 2021-09-16 NOTE — PLAN OF CARE
Problem: Adjustment to Role Transition (Postpartum Vaginal Delivery)  Goal: Successful Maternal Role Transition  Outcome: Ongoing, Progressing     Problem: Bleeding (Postpartum Vaginal Delivery)  Goal: Hemostasis  Outcome: Ongoing, Progressing     Problem: Infection (Postpartum Vaginal Delivery)  Goal: Absence of Infection Signs and Symptoms  Outcome: Ongoing, Progressing  Intervention: Prevent or Manage Infection  Description: Encourage perineal care; if present, monitor episiotomy site for swelling, redness and drainage.  Implement transmission-based precautions and isolation, as indicated, to prevent spread of infection.  Obtain cultures prior to initiating antimicrobial therapy. Do not delay treatment for laboratory results in the presence of high suspicion.  Note: If endometritis is suspected, treatment may be initiated without obtaining cultures.  Administer ordered antimicrobial therapy promptly; reassess need regularly.  Identify and manage signs of early sepsis.  If perineal wound infection is identified, anticipate need for suture removal, debridement and cleansing.  Notify infant’s care provider of maternal infection.  Recent Flowsheet Documentation  Taken 9/15/2021 2050 by Guicho May RN  Perineal Care:   perineum cleansed   absorbent pad changed     Problem: Pain (Postpartum Vaginal Delivery)  Goal: Acceptable Pain Control  Outcome: Ongoing, Progressing  Intervention: Prevent or Manage Pain  Description: Determine pain management plan with patient and caregiver; review plan regularly.  Use a consistent, validated tool for pain assessment; evaluate pain level, effect of treatment and patient’s response at regular intervals.  Monitor perineal condition; note presence of hematoma, hemorrhoids and episiotomy appearance (if applicable).  Use cold application, as culturally-appropriate, to the perineal area for the first 24 to 48 hours following delivery for comfort.  Verify correct infant latch when  breastfeeding to prevent nipple pain.  Consider the presence and impact of preexisting chronic pain.  Encourage patient and caregiver involvement in pain assessment, interventions and safety measures.  Individualize pharmacologic pain management plan; titrate medication to patient response.  Monitor and address medication-induced side effects, such as constipation, nausea, vomiting.  Initiate individualized nonpharmacologic pain management measures.  Consider and address emotional response to pain.  If engorgement occurs, encourage more frequent breastfeeding or pumping and storing additional milk to ease discomfort.  Note: Cold compresses, as culturally-appropriate, may be used if bottle-feeding.  If post-dural puncture headache identified, encourage adequate hydration and anticipate the need for epidural blood patch.  If hemorrhoids are present and painful, offer topical pain relief and sitz baths for comfort.  Recent Flowsheet Documentation  Taken 9/15/2021 2050 by Guicho May RN  Pain Management Interventions:   position adjusted   pillow support provided     Problem: Urinary Retention (Postpartum Vaginal Delivery)  Goal: Effective Urinary Elimination  Outcome: Ongoing, Progressing   Goal Outcome Evaluation:   Progressing towards goals of discharge.

## 2021-09-16 NOTE — DISCHARGE INSTRUCTIONS
DR. DASH'S POSTPARTUM DISCHARGE PRECAUTIONS and Answers to FAQs     NO SEX for SIX weeks.     NO TUB BATH or POOL for TWO week(s), shower only.  Sitz baths are fine.     STITCHES (if present):  wash them daily in the shower with soap and water (any type of soap is fine, it does not need to be antibacterial soap).  It is ok to gently put your finger in and around the vaginal area.  Look at your stitches (the ones on the outside) when you get home.  You will then know what is normal and can have a point of reference to compare it to if you start to have concerns.  REDNESS, PUS, increase in PAIN, FEVER or CHILLS are all reasons to be seen our office immediately.  Go to the ER, if it is after hours or a weekend.       VAGINAL BLEEDING:  may continue on and off over the next several weeks after delivery and may increase slightly once you go home.  You should not be bleeding more than 1 large pad soaked every hour or two.  Clots (even the size of a lemon or larger) may be normal as long as the bleeding is not heavy and the clots do not continue.       FEVER or CHILLS or NOT FEELING WELL: call our office.  If the office is closed, you need to be seen in acute care or ER.       CHEST PAIN or SHORTNESS OF BREATH/AIR: you need to GO TO THE NEAREST ER or CALL 911.      SWELLING: can increase over the next 7-10 days and then should slowly improve.  Your legs/ankles should be fairly similar in size.  A red, painful, hot, swollen leg (usually just one side) can be a sign of a blood clot and should be evaluated immediately.  Call our office.  If it is after hours or a weekend, you must be seen IMMEDIATELY IN THE ER.      ELEVATED BLOOD PRESSURE:  you need to contact us if you are having  persistent elevated BP systolic (top number) more than 155 or diastolic (bottom number) more than 95, or a headache (not relieved with rest, hydration or over the counter pain reliever), an increase in your swelling (usually hands and face),  changes in your vision (typically flashing white or black spots) or severe persistent pain in the location of the upper right side of your belly (under your right breast).  Call our office or go to ER if after hours or a weekend.     LACTATION QUESTIONS or CONCERNS?  Call [facility] Lactation Support [phone].     WORK and SCHOOL TIME OFF: depends on your specific delivery type, surrounding circumstances, and your work insurance/school rules.  If you have questions, please call Jory or Lisa at [phone] (ext. [phone]).  Or email Jory at [email or web address].  They will assist in required paperwork for you and/or family members.      Any further QUESTIONS or CONCERNS, please call [facility] KAREN Corona at [phone].

## 2021-10-21 ENCOUNTER — TELEPHONE (OUTPATIENT)
Dept: OBSTETRICS AND GYNECOLOGY | Facility: CLINIC | Age: 26
End: 2021-10-21

## 2021-10-26 ENCOUNTER — POSTPARTUM VISIT (OUTPATIENT)
Dept: OBSTETRICS AND GYNECOLOGY | Facility: CLINIC | Age: 26
End: 2021-10-26

## 2021-10-26 VITALS
HEIGHT: 60 IN | WEIGHT: 142 LBS | BODY MASS INDEX: 27.88 KG/M2 | DIASTOLIC BLOOD PRESSURE: 73 MMHG | SYSTOLIC BLOOD PRESSURE: 108 MMHG | HEART RATE: 76 BPM

## 2021-10-26 DIAGNOSIS — Z30.09 BIRTH CONTROL COUNSELING: Primary | ICD-10-CM

## 2021-10-26 PROCEDURE — 0503F POSTPARTUM CARE VISIT: CPT | Performed by: OBSTETRICS & GYNECOLOGY

## 2021-10-26 NOTE — PROGRESS NOTES
"POSTPARTUM Follow Up Visit    CC:  Postpartum     HPI:      Antepartum or Postpartum complications: None  Delivery type:    Perineum: Intact  Feeding: Bottle     Pain:  No  Vaginal Bleeding:  No  EPDS score: 2  Plans for BC:  OCP (estrogen/progesterone)  Last PAP:   Last Completed Pap Smear          PAP SMEAR (Every 3 Years) Next due on 2021  SCANNED - PAP SMEAR                /73   Pulse 76   Ht 152.4 cm (60\")   Wt 64.4 kg (142 lb)   LMP 2020   BMI 27.73 kg/m²     Physical Exam  Vitals and nursing note reviewed. Exam conducted with a chaperone present.   Constitutional:       General: She is not in acute distress.     Appearance: Normal appearance. She is normal weight. She is not ill-appearing.   Neck:      Thyroid: No thyroid mass or thyromegaly.   Cardiovascular:      Rate and Rhythm: Normal rate and regular rhythm.      Heart sounds: Normal heart sounds. No murmur heard.      Pulmonary:      Effort: Pulmonary effort is normal. No respiratory distress.      Breath sounds: No wheezing.   Abdominal:      General: There is no distension.      Palpations: Abdomen is soft. There is no mass.      Tenderness: There is no abdominal tenderness.   Musculoskeletal:      Right lower leg: No edema.      Left lower leg: No edema.   Skin:     General: Skin is warm and dry.   Neurological:      Mental Status: She is alert and oriented to person, place, and time.   Psychiatric:         Mood and Affect: Mood normal.         Behavior: Behavior normal.         Thought Content: Thought content normal.           ASSESSMENT AND PLAN:  Diagnoses and all orders for this visit:    1. Birth control counseling (Primary)  -     norgestrel-ethinyl estradiol (LOW-OGESTREL,CRYSELLE) 0.3-30 MG-MCG per tablet; Take 1 tablet by mouth Daily.  Dispense: 28 tablet; Refill: 12        Counseling:  All birth control options reviewed in detail.  R/B/A/SE/E of each wrt pts PMHx and prior BC use.  May resume " intercourse  May resume normal activities  Core strengthening exercises reviewed and recommended  Kegel exercises reviewed and recommended  Ok to return to work/school  Use backup contraception for 4 weeks after initiating chosen BC.      Follow Up:  Return for Annual physical.      Morgan Noel MD  10/26/2021

## 2021-12-22 ENCOUNTER — TELEPHONE (OUTPATIENT)
Dept: OBSTETRICS AND GYNECOLOGY | Facility: CLINIC | Age: 26
End: 2021-12-22

## 2022-01-18 ENCOUNTER — OFFICE VISIT (OUTPATIENT)
Dept: OBSTETRICS AND GYNECOLOGY | Facility: CLINIC | Age: 27
End: 2022-01-18

## 2022-01-18 VITALS
BODY MASS INDEX: 27.09 KG/M2 | SYSTOLIC BLOOD PRESSURE: 110 MMHG | WEIGHT: 138 LBS | HEART RATE: 93 BPM | DIASTOLIC BLOOD PRESSURE: 69 MMHG | HEIGHT: 60 IN

## 2022-01-18 DIAGNOSIS — N93.9 ABNORMAL UTERINE BLEEDING (AUB): Primary | ICD-10-CM

## 2022-01-18 DIAGNOSIS — Z30.09 BIRTH CONTROL COUNSELING: ICD-10-CM

## 2022-01-18 PROBLEM — O09.899 MATERNAL VARICELLA, NON-IMMUNE: Status: RESOLVED | Noted: 2021-09-07 | Resolved: 2022-01-18

## 2022-01-18 PROBLEM — Z34.80 SUPERVISION OF OTHER NORMAL PREGNANCY, ANTEPARTUM: Status: RESOLVED | Noted: 2021-09-07 | Resolved: 2022-01-18

## 2022-01-18 PROBLEM — Z28.39 MATERNAL VARICELLA, NON-IMMUNE: Status: RESOLVED | Noted: 2021-09-07 | Resolved: 2022-01-18

## 2022-01-18 PROBLEM — Z34.90 ENCOUNTER FOR INDUCTION OF LABOR: Status: RESOLVED | Noted: 2021-09-15 | Resolved: 2022-01-18

## 2022-01-18 PROBLEM — Z87.59 HISTORY OF GESTATIONAL HYPERTENSION: Status: RESOLVED | Noted: 2021-09-07 | Resolved: 2022-01-18

## 2022-01-18 LAB
BASOPHILS # BLD AUTO: 0.05 10*3/MM3 (ref 0–0.2)
BASOPHILS NFR BLD AUTO: 0.6 % (ref 0–1.5)
DEPRECATED RDW RBC AUTO: 37.9 FL (ref 37–54)
EOSINOPHIL # BLD AUTO: 0.06 10*3/MM3 (ref 0–0.4)
EOSINOPHIL NFR BLD AUTO: 0.7 % (ref 0.3–6.2)
ERYTHROCYTE [DISTWIDTH] IN BLOOD BY AUTOMATED COUNT: 12 % (ref 12.3–15.4)
HCT VFR BLD AUTO: 45.7 % (ref 34–46.6)
HGB BLD-MCNC: 15.4 G/DL (ref 12–15.9)
IMM GRANULOCYTES # BLD AUTO: 0.03 10*3/MM3 (ref 0–0.05)
IMM GRANULOCYTES NFR BLD AUTO: 0.4 % (ref 0–0.5)
LYMPHOCYTES # BLD AUTO: 2.87 10*3/MM3 (ref 0.7–3.1)
LYMPHOCYTES NFR BLD AUTO: 34 % (ref 19.6–45.3)
MCH RBC QN AUTO: 29.3 PG (ref 26.6–33)
MCHC RBC AUTO-ENTMCNC: 33.7 G/DL (ref 31.5–35.7)
MCV RBC AUTO: 86.9 FL (ref 79–97)
MONOCYTES # BLD AUTO: 0.5 10*3/MM3 (ref 0.1–0.9)
MONOCYTES NFR BLD AUTO: 5.9 % (ref 5–12)
NEUTROPHILS NFR BLD AUTO: 4.92 10*3/MM3 (ref 1.7–7)
NEUTROPHILS NFR BLD AUTO: 58.4 % (ref 42.7–76)
NRBC BLD AUTO-RTO: 0 /100 WBC (ref 0–0.2)
PLATELET # BLD AUTO: 369 10*3/MM3 (ref 140–450)
PMV BLD AUTO: 9.5 FL (ref 6–12)
RBC # BLD AUTO: 5.26 10*6/MM3 (ref 3.77–5.28)
T4 FREE SERPL-MCNC: 1.4 NG/DL (ref 0.93–1.7)
TSH SERPL DL<=0.05 MIU/L-ACNC: 0.93 UIU/ML (ref 0.27–4.2)
WBC NRBC COR # BLD: 8.43 10*3/MM3 (ref 3.4–10.8)

## 2022-01-18 PROCEDURE — 84439 ASSAY OF FREE THYROXINE: CPT | Performed by: OBSTETRICS & GYNECOLOGY

## 2022-01-18 PROCEDURE — 99213 OFFICE O/P EST LOW 20 MIN: CPT | Performed by: OBSTETRICS & GYNECOLOGY

## 2022-01-18 PROCEDURE — 85025 COMPLETE CBC W/AUTO DIFF WBC: CPT | Performed by: OBSTETRICS & GYNECOLOGY

## 2022-01-18 PROCEDURE — 84443 ASSAY THYROID STIM HORMONE: CPT | Performed by: OBSTETRICS & GYNECOLOGY

## 2022-01-18 NOTE — PROGRESS NOTES
"GYN Visit    CC: Discussed birth control    HPI:   26 y.o. Has had nearly daily vaginal bleeding since delivery. On OCPs. Just finished second pack. Has now stopped bleeding. Has not started third ocp pack. Also complains of fatigue and emotional changes      History: PMHx, Meds, Allergies, PSHx, Social Hx, and POBHx all reviewed and updated.    /69   Pulse 93   Ht 152.4 cm (60\")   Wt 62.6 kg (138 lb)   LMP 01/10/2022   Breastfeeding No   BMI 26.95 kg/m²     Physical Exam  Vitals and nursing note reviewed. Exam conducted with a chaperone present.   Constitutional:       General: She is not in acute distress.     Appearance: Normal appearance. She is not ill-appearing.   Abdominal:      General: Abdomen is flat. Bowel sounds are normal. There is no distension.      Palpations: Abdomen is soft. There is no mass.      Tenderness: There is no abdominal tenderness. There is no guarding or rebound.      Hernia: No hernia is present.   Genitourinary:     General: Normal vulva.      Exam position: Lithotomy position.      Pubic Area: No rash.       Labia:         Right: No rash, tenderness, lesion or injury.         Left: No rash, tenderness, lesion or injury.       Vagina: Normal.      Cervix: Normal.      Uterus: Normal.       Adnexa: Right adnexa normal.   Musculoskeletal:         General: No swelling.      Right lower leg: No edema.      Left lower leg: No edema.   Skin:     General: Skin is warm and dry.      Findings: No rash.   Neurological:      Mental Status: She is alert and oriented to person, place, and time.   Psychiatric:         Mood and Affect: Mood normal.         Behavior: Behavior normal.         Thought Content: Thought content normal.           ASSESSMENT AND PLAN:  Diagnoses and all orders for this visit:    1. Abnormal uterine bleeding (AUB) (Primary)  -     CBC & Differential; Future  -     TSH; Future  -     T4, Free  -     US Pelvis Transvaginal Non OB; Future  -     CBC & " Differential  -     TSH    2. Birth control counseling  Assessment & Plan:  Discontinue current birth control pills  Condoms for contraception, or preferably no intercourse until further evaluation        Follow Up:  Return in about 2 weeks (around 2/1/2022) for Recheck.    Morgan Noel MD  01/18/2022

## 2022-01-21 NOTE — ASSESSMENT & PLAN NOTE
Discontinue current birth control pills  Condoms for contraception, or preferably no intercourse until further evaluation

## 2022-01-24 ENCOUNTER — TRANSCRIBE ORDERS (OUTPATIENT)
Dept: ADMINISTRATIVE | Facility: HOSPITAL | Age: 27
End: 2022-01-24

## 2022-01-24 ENCOUNTER — TELEPHONE (OUTPATIENT)
Dept: OBSTETRICS AND GYNECOLOGY | Facility: CLINIC | Age: 27
End: 2022-01-24

## 2022-01-24 DIAGNOSIS — N93.9 ABNORMAL UTERINE BLEEDING (AUB): Primary | ICD-10-CM

## 2022-01-24 NOTE — TELEPHONE ENCOUNTER
Sending this order to you because you are doctor of the day and  is out of office till next week. This has been ordered stat and patient has appointment tomorrow and scheduling is needing this signed order. Please sign.     Thanks.

## 2022-01-24 NOTE — TELEPHONE ENCOUNTER
Needing to speak to patient in regards to pelvic ultrasound that's scheduled and needing to get follow up with  scheduled.

## 2022-01-25 ENCOUNTER — HOSPITAL ENCOUNTER (OUTPATIENT)
Dept: ULTRASOUND IMAGING | Facility: HOSPITAL | Age: 27
Discharge: HOME | End: 2022-01-25
Admitting: OBSTETRICS & GYNECOLOGY

## 2022-01-25 DIAGNOSIS — N93.9 ABNORMAL UTERINE BLEEDING (AUB): ICD-10-CM

## 2022-01-25 PROCEDURE — 76830 TRANSVAGINAL US NON-OB: CPT

## 2022-02-01 ENCOUNTER — OFFICE VISIT (OUTPATIENT)
Dept: OBSTETRICS AND GYNECOLOGY | Facility: CLINIC | Age: 27
End: 2022-02-01

## 2022-02-01 VITALS
BODY MASS INDEX: 26.95 KG/M2 | HEART RATE: 76 BPM | SYSTOLIC BLOOD PRESSURE: 107 MMHG | WEIGHT: 138 LBS | DIASTOLIC BLOOD PRESSURE: 67 MMHG

## 2022-02-01 DIAGNOSIS — N93.9 ABNORMAL UTERINE BLEEDING (AUB): Primary | ICD-10-CM

## 2022-02-01 DIAGNOSIS — Z30.09 BIRTH CONTROL COUNSELING: ICD-10-CM

## 2022-02-01 PROCEDURE — 99213 OFFICE O/P EST LOW 20 MIN: CPT | Performed by: OBSTETRICS & GYNECOLOGY

## 2022-02-01 RX ORDER — NORETHINDRONE ACETATE AND ETHINYL ESTRADIOL AND FERROUS FUMARATE 1MG-20(24)
1 KIT ORAL DAILY
Qty: 28 TABLET | Refills: 12 | Status: SHIPPED | OUTPATIENT
Start: 2022-02-01 | End: 2023-02-01

## 2022-02-01 NOTE — PROGRESS NOTES
GYN Visit    CC: Follow-up ultrasound    HPI:   26 y.o. who presents for a follow-up ultrasound in regards to abnormal uterine bleeding.  Since her last visit and discontinuing her birth control pills her menstrual bleeding has stopped.  She has had no new problems.  She has not had a menstrual flow since her last visit.  She is interested in potentially moving forward with a Mirena IUD for further birth control.  She has some concerns about the resumption of abnormal menstruation and/or pelvic pain after insertion.    History: PMHx, Meds, Allergies, PSHx, Social Hx, and POBHx all reviewed and updated.    /67   Pulse 76   Wt 62.6 kg (138 lb)   LMP 01/10/2022   BMI 26.95 kg/m²     Physical Exam  Vitals and nursing note reviewed.   Constitutional:       General: She is not in acute distress.     Appearance: Normal appearance. She is not ill-appearing.   Abdominal:      General: Abdomen is flat. There is no distension.      Palpations: Abdomen is soft. There is no mass.      Tenderness: There is no abdominal tenderness. There is no guarding or rebound.      Hernia: No hernia is present.   Musculoskeletal:      Right lower leg: No edema.      Left lower leg: No edema.   Skin:     General: Skin is warm and dry.      Findings: No rash.   Neurological:      Mental Status: She is alert and oriented to person, place, and time.   Psychiatric:         Mood and Affect: Mood normal.         Behavior: Behavior normal.         Thought Content: Thought content normal.         Judgment: Judgment normal.       US Non-ob Transvaginal [[id number]] (Order [id number])  Order  Status: Final result       Study Result    PROCEDURE:  US NON-OB TRANSVAGINAL     COMPARISON: None     INDICATIONS:  AUB     TECHNIQUE:    Ultrasound examination of the pelvis was performed, using endovaginal technique.       FINDINGS:          Uterus measures 7.8 x 3.7 x 5.8 cm.     Endometrial stripe measures 8 mm.     Right ovary measures 3.6 x 1.2 x  2.2 cm.     Left ovary measures 3.1 x 1.8 x 2.0 cm.     The uterus endometrium appear unremarkable..  Ovaries demonstrate normal color flow and Doppler   interrogation with normal appearing follicles.     No free fluid.     IMPRESSION:               Negative study            NARDA MACIEL MD         Electronically Signed and Approved By: NARDA MACIEL MD on 1/25/2022 at 12:07        ASSESSMENT AND PLAN:  Diagnoses and all orders for this visit:    1. Abnormal uterine bleeding (AUB) (Primary)  Assessment & Plan:  The patient's abnormal bleeding has resolved and was likely related to her previous birth control pill.  We have discussed further options including trying a different oral contraceptive, using nonhormonal birth control, or proceeding with Mirena IUD.  The risks, benefits, and alternatives of each of these options were reviewed.  Handouts on Mirena IUD and all other IUDs were provided at the end of the visit.  The patient has elected to proceed with oral contraceptives for now and will consider if she wishes to have an IUD in the future      2. Birth control counseling  -     norethindrone-ethinyl estradiol-ferrous fumarate (LOESTIN 24 FE) 1-20 MG-MCG(24) per tablet; Take 1 tablet by mouth Daily.  Dispense: 28 tablet; Refill: 12      Counseling: TRACK MENSES, RTO if <q21d, >7d long, heavy or painful.    All BIRTH CONTROL options R/B/A/SE/E of each reviewed in detail.  OCP/hormone use risk THROMBOEMBOLIC RISK reviewed.     SAFE SEX/condoms importance reviewed.      Follow Up:  Return if symptoms worsen or fail to improve.      Morgan Noel MD  02/01/2022

## 2022-02-03 NOTE — ASSESSMENT & PLAN NOTE
The patient's abnormal bleeding has resolved and was likely related to her previous birth control pill.  We have discussed further options including trying a different oral contraceptive, using nonhormonal birth control, or proceeding with Mirena IUD.  The risks, benefits, and alternatives of each of these options were reviewed.  Handouts on Mirena IUD and all other IUDs were provided at the end of the visit.  The patient has elected to proceed with oral contraceptives for now and will consider if she wishes to have an IUD in the future

## 2022-11-11 DIAGNOSIS — Z30.09 BIRTH CONTROL COUNSELING: ICD-10-CM

## 2022-11-11 RX ORDER — NORGESTREL-ETHINYL ESTRADIOL 0.3-0.03MG
TABLET ORAL
Qty: 28 TABLET | Refills: 12 | OUTPATIENT
Start: 2022-11-11

## 2022-11-11 NOTE — TELEPHONE ENCOUNTER
Refill request is denied because Dr. Noel listed Cryselle as therapy completed on 02/01/2022 and then sent an rx for the patient for a year for Loestrin FE, which will last her until she is due to come back in February 2023 for her Well Woman Exam.

## 2023-05-08 ENCOUNTER — TELEPHONE (OUTPATIENT)
Dept: OBSTETRICS AND GYNECOLOGY | Facility: CLINIC | Age: 28
End: 2023-05-08

## 2023-05-08 NOTE — TELEPHONE ENCOUNTER
ALEJANDRO GARCIA    [phone]    PTS LAST 2 PREGNANCY HAD TO GET LINING CK EVERY 2wks AFTER 20wks     PLEASE ADVISE IF PT NEEDS TO BE SEEN SOONER THAN 7/19    LMP 4/3/23

## 2023-05-09 NOTE — TELEPHONE ENCOUNTER
Patient advised that she would be seen before she would be potentially due to have that performed if needed for this pregnancy. She wa advised that she can call to check for a sooner appointment if she desires.

## 2023-05-12 ENCOUNTER — TELEPHONE (OUTPATIENT)
Dept: OBSTETRICS AND GYNECOLOGY | Facility: CLINIC | Age: 28
End: 2023-05-12

## 2023-05-12 NOTE — TELEPHONE ENCOUNTER
"Secure chat from Dr. Rasheed: \"This is a patient of mine that is early pregnant.  Please get her an ultrasound at approximately 8 weeks gestational age and follow-up with me 1 to 2 weeks later for a new OB visit.  Thank you\"    Left a message for the patient to confirm her LMP so the ultrasound can be schedule at the correct time frame and advised her of the recommended appointments.  "

## 2023-05-15 DIAGNOSIS — O36.80X0 PREGNANCY WITH UNCERTAIN FETAL VIABILITY, SINGLE OR UNSPECIFIED FETUS: Primary | ICD-10-CM

## 2023-05-15 NOTE — TELEPHONE ENCOUNTER
Patient called back and advised of needed appointment/appointment change. Visits scheduled/rescheduled.

## 2023-05-22 ENCOUNTER — APPOINTMENT (OUTPATIENT)
Dept: ULTRASOUND IMAGING | Facility: HOSPITAL | Age: 28
End: 2023-05-22
Payer: COMMERCIAL

## 2023-05-22 ENCOUNTER — TELEPHONE (OUTPATIENT)
Dept: OBSTETRICS AND GYNECOLOGY | Facility: CLINIC | Age: 28
End: 2023-05-22
Payer: COMMERCIAL

## 2023-05-22 ENCOUNTER — HOSPITAL ENCOUNTER (EMERGENCY)
Facility: HOSPITAL | Age: 28
Discharge: HOME | End: 2023-05-22
Attending: EMERGENCY MEDICINE | Admitting: EMERGENCY MEDICINE
Payer: COMMERCIAL

## 2023-05-22 VITALS
SYSTOLIC BLOOD PRESSURE: 138 MMHG | TEMPERATURE: 97.9 F | DIASTOLIC BLOOD PRESSURE: 86 MMHG | WEIGHT: 120.15 LBS | HEART RATE: 87 BPM | OXYGEN SATURATION: 100 % | RESPIRATION RATE: 16 BRPM | BODY MASS INDEX: 23.59 KG/M2 | HEIGHT: 60 IN

## 2023-05-22 DIAGNOSIS — O20.0 THREATENED MISCARRIAGE IN EARLY PREGNANCY: Primary | ICD-10-CM

## 2023-05-22 LAB
ABO GROUP BLD: NORMAL
ALBUMIN SERPL-MCNC: 4.7 G/DL (ref 3.5–5.2)
ALBUMIN/GLOB SERPL: 1.9 G/DL
ALP SERPL-CCNC: 46 U/L (ref 39–117)
ALT SERPL W P-5'-P-CCNC: 15 U/L (ref 1–33)
ANION GAP SERPL CALCULATED.3IONS-SCNC: 11.5 MMOL/L (ref 5–15)
AST SERPL-CCNC: 15 U/L (ref 1–32)
BACTERIA UR QL AUTO: ABNORMAL /HPF
BASOPHILS # BLD AUTO: 0.07 10*3/MM3 (ref 0–0.2)
BASOPHILS NFR BLD AUTO: 0.8 % (ref 0–1.5)
BILIRUB SERPL-MCNC: 0.3 MG/DL (ref 0–1.2)
BILIRUB UR QL STRIP: NEGATIVE
BLD GP AB SCN SERPL QL: NEGATIVE
BUN SERPL-MCNC: 12 MG/DL (ref 6–20)
BUN/CREAT SERPL: 14.6 (ref 7–25)
CALCIUM SPEC-SCNC: 9.6 MG/DL (ref 8.6–10.5)
CHLORIDE SERPL-SCNC: 102 MMOL/L (ref 98–107)
CLARITY UR: CLEAR
CO2 SERPL-SCNC: 23.5 MMOL/L (ref 22–29)
COLOR UR: YELLOW
CREAT SERPL-MCNC: 0.82 MG/DL (ref 0.57–1)
DEPRECATED RDW RBC AUTO: 37.6 FL (ref 37–54)
EGFRCR SERPLBLD CKD-EPI 2021: 99.4 ML/MIN/1.73
EOSINOPHIL # BLD AUTO: 0.04 10*3/MM3 (ref 0–0.4)
EOSINOPHIL NFR BLD AUTO: 0.4 % (ref 0.3–6.2)
ERYTHROCYTE [DISTWIDTH] IN BLOOD BY AUTOMATED COUNT: 12.4 % (ref 12.3–15.4)
GLOBULIN UR ELPH-MCNC: 2.5 GM/DL
GLUCOSE SERPL-MCNC: 91 MG/DL (ref 65–99)
GLUCOSE UR STRIP-MCNC: NEGATIVE MG/DL
HCG INTACT+B SERPL-ACNC: 1071 MIU/ML
HCT VFR BLD AUTO: 43.8 % (ref 34–46.6)
HGB BLD-MCNC: 15.1 G/DL (ref 12–15.9)
HGB UR QL STRIP.AUTO: ABNORMAL
HOLD SPECIMEN: 11
HOLD SPECIMEN: NORMAL
HYALINE CASTS UR QL AUTO: ABNORMAL /LPF
IMM GRANULOCYTES # BLD AUTO: 0.04 10*3/MM3 (ref 0–0.05)
IMM GRANULOCYTES NFR BLD AUTO: 0.4 % (ref 0–0.5)
KETONES UR QL STRIP: NEGATIVE
LEUKOCYTE ESTERASE UR QL STRIP.AUTO: NEGATIVE
LYMPHOCYTES # BLD AUTO: 2.13 10*3/MM3 (ref 0.7–3.1)
LYMPHOCYTES NFR BLD AUTO: 23.5 % (ref 19.6–45.3)
MCH RBC QN AUTO: 28.7 PG (ref 26.6–33)
MCHC RBC AUTO-ENTMCNC: 34.5 G/DL (ref 31.5–35.7)
MCV RBC AUTO: 83.3 FL (ref 79–97)
MONOCYTES # BLD AUTO: 0.57 10*3/MM3 (ref 0.1–0.9)
MONOCYTES NFR BLD AUTO: 6.3 % (ref 5–12)
NEUTROPHILS NFR BLD AUTO: 6.22 10*3/MM3 (ref 1.7–7)
NEUTROPHILS NFR BLD AUTO: 68.6 % (ref 42.7–76)
NITRITE UR QL STRIP: NEGATIVE
NRBC BLD AUTO-RTO: 0 /100 WBC (ref 0–0.2)
NUMBER OF DOSES: NORMAL
PH UR STRIP.AUTO: 6.5 [PH] (ref 5–8)
PLATELET # BLD AUTO: 311 10*3/MM3 (ref 140–450)
PMV BLD AUTO: 8.1 FL (ref 6–12)
POTASSIUM SERPL-SCNC: 4.5 MMOL/L (ref 3.5–5.2)
PROT SERPL-MCNC: 7.2 G/DL (ref 6–8.5)
PROT UR QL STRIP: NEGATIVE
RBC # BLD AUTO: 5.26 10*6/MM3 (ref 3.77–5.28)
RBC # UR STRIP: ABNORMAL /HPF
REF LAB TEST METHOD: ABNORMAL
RH BLD: POSITIVE
SODIUM SERPL-SCNC: 137 MMOL/L (ref 136–145)
SP GR UR STRIP: 1.01 (ref 1–1.03)
SQUAMOUS #/AREA URNS HPF: ABNORMAL /HPF
UROBILINOGEN UR QL STRIP: ABNORMAL
WBC # UR STRIP: ABNORMAL /HPF
WBC NRBC COR # BLD: 9.07 10*3/MM3 (ref 3.4–10.8)
WHOLE BLOOD HOLD COAG: 11
WHOLE BLOOD HOLD SPECIMEN: 11

## 2023-05-22 PROCEDURE — 84702 CHORIONIC GONADOTROPIN TEST: CPT | Performed by: EMERGENCY MEDICINE

## 2023-05-22 PROCEDURE — 86850 RBC ANTIBODY SCREEN: CPT | Performed by: PHYSICIAN ASSISTANT

## 2023-05-22 PROCEDURE — 80053 COMPREHEN METABOLIC PANEL: CPT | Performed by: PHYSICIAN ASSISTANT

## 2023-05-22 PROCEDURE — 99283 EMERGENCY DEPT VISIT LOW MDM: CPT

## 2023-05-22 PROCEDURE — 36415 COLL VENOUS BLD VENIPUNCTURE: CPT

## 2023-05-22 PROCEDURE — 85025 COMPLETE CBC W/AUTO DIFF WBC: CPT | Performed by: EMERGENCY MEDICINE

## 2023-05-22 PROCEDURE — 76817 TRANSVAGINAL US OBSTETRIC: CPT

## 2023-05-22 PROCEDURE — 36415 COLL VENOUS BLD VENIPUNCTURE: CPT | Performed by: EMERGENCY MEDICINE

## 2023-05-22 PROCEDURE — 81001 URINALYSIS AUTO W/SCOPE: CPT | Performed by: PHYSICIAN ASSISTANT

## 2023-05-22 PROCEDURE — 86900 BLOOD TYPING SEROLOGIC ABO: CPT | Performed by: PHYSICIAN ASSISTANT

## 2023-05-22 PROCEDURE — 87086 URINE CULTURE/COLONY COUNT: CPT | Performed by: PHYSICIAN ASSISTANT

## 2023-05-22 PROCEDURE — 86901 BLOOD TYPING SEROLOGIC RH(D): CPT | Performed by: PHYSICIAN ASSISTANT

## 2023-05-22 RX ORDER — SODIUM CHLORIDE 0.9 % (FLUSH) 0.9 %
10 SYRINGE (ML) INJECTION AS NEEDED
Status: DISCONTINUED | OUTPATIENT
Start: 2023-05-22 | End: 2023-05-22 | Stop reason: HOSPADM

## 2023-05-22 NOTE — TELEPHONE ENCOUNTER
Patient called stating she was seen in the ER this morning and told to contact her OB for recommendations. She was told to repeat an HCG by the ER in 48-72 hours. She also is already scheduled for an ultrasound 5/30 (previously scheduled per protocol). She has her initial OB set for 6/6. Any further recommendations other than labs and ultrasound as scheduled? Please advise.

## 2023-05-22 NOTE — DISCHARGE INSTRUCTIONS
Your HCG level was 1071 at 10:30 AM today (5/22). Your ultrasound does not show a yolk sac or fetal pole at this time but it could either be an early miscarriage or too early in pregnancy to visualize. I would like for you to have your HCG level rechecked in 48-72 hours (not before) to trend. It will be easier to follow this through your OB/GYN versus your OB but I have placed an order through hospital lab in case you need somewhere to have this drawn. Return with severe worsening pain (especially one sided), bleeding >  1 pad per hour, passing out or any other worsening or concerning symptoms. Pelvic rest until OB/GYN follow-up.

## 2023-05-22 NOTE — Clinical Note
[facility] EMERGENCY ROOM  [address]  [place] [address]  Phone: [phone]    Marisol Ku was seen and treated in our emergency department on 5/22/2023.  She may return to work on 05/25/2023.         Thank you for choosing [facility].    Laina Muñoz PA-C

## 2023-05-22 NOTE — TELEPHONE ENCOUNTER
Patient called back and advised of the recommendation for the lab and ultrasound. She voiced understanding of the bleeding and pain precautions.

## 2023-05-22 NOTE — ED PROVIDER NOTES
Time: 11:20 AM EDT  Date of encounter:  2023  Independent Historian/Clinical History and Information was obtained by:   Patient  Chief Complaint   Patient presents with   • Vaginal Bleeding - Pregnant       History is limited by: N/A    History of Present Illness:  Patient is a 28 y.o. year old female who presents to the emergency department for evaluation of vaginal bleeding and pregnancy. She is  with LMP 4/3/23. Started having light spotting 2 days ago. This resolved however, started having heavier vaginal bleeding with some clots this AM. Associated with mild cramping abdominal pain, not localized. Hx of LEEP procedure. Has appt with OB for US on 23. (Laina Muñoz PA-C provider in triage 11:21 AM EDT )     HPI    Patient Care Team  Primary Care Provider: Juliet Raza APRN    Past Medical History:     No Known Allergies  Past Medical History:   Diagnosis Date   • History of gestational hypertension    • Preeclampsia      Past Surgical History:   Procedure Laterality Date   • CERVICAL BIOPSY  W/ LOOP ELECTRODE EXCISION      Dr. Chakraborty   • CHOLECYSTECTOMY     • WISDOM TOOTH EXTRACTION       Family History   Problem Relation Age of Onset   • Hyperlipidemia Other        Home Medications:  Prior to Admission medications    Medication Sig Start Date End Date Taking? Authorizing Provider   cetirizine (zyrTEC) 10 MG tablet Take 10 mg by mouth Daily.    Provider, MD Kylie   norethindrone-ethinyl estradiol-ferrous fumarate (LOESTIN 24 FE) 1-20 MG-MCG(24) per tablet Take 1 tablet by mouth Daily. 22  Morgan Noel MD        Social History:   Social History     Tobacco Use   • Smoking status: Never   • Smokeless tobacco: Never   Vaping Use   • Vaping Use: Never used   Substance Use Topics   • Alcohol use: Not Currently   • Drug use: Never         Review of Systems:  Review of Systems   Constitutional: Negative for chills and fever.   HENT: Negative for congestion and  "sore throat.    Respiratory: Negative for cough and shortness of breath.    Cardiovascular: Negative for chest pain and palpitations.   Gastrointestinal: Positive for abdominal pain (mild cramping). Negative for diarrhea, nausea and vomiting.   Genitourinary: Positive for vaginal bleeding. Negative for dysuria, flank pain and pelvic pain.   Neurological: Negative for headaches.   All other systems reviewed and are negative.       Physical Exam:  /86 (BP Location: Right arm, Patient Position: Sitting)   Pulse 87   Temp 97.9 °F (36.6 °C) (Oral)   Resp 16   Ht 152.4 cm (60\")   Wt 54.5 kg (120 lb 2.4 oz)   SpO2 100%   BMI 23.47 kg/m²     Physical Exam  Vitals and nursing note reviewed.   Constitutional:       Appearance: Normal appearance. She is not ill-appearing or toxic-appearing.   HENT:      Head: Normocephalic.      Nose: Nose normal.      Mouth/Throat:      Mouth: Mucous membranes are moist.   Eyes:      Conjunctiva/sclera: Conjunctivae normal.   Cardiovascular:      Rate and Rhythm: Normal rate and regular rhythm.   Pulmonary:      Effort: Pulmonary effort is normal.      Breath sounds: Normal breath sounds.   Abdominal:      Palpations: Abdomen is soft.      Tenderness: There is no abdominal tenderness. There is no guarding or rebound.   Genitourinary:     Comments: Deferred  Musculoskeletal:         General: Normal range of motion.      Cervical back: Normal range of motion.   Skin:     General: Skin is warm and dry.      Capillary Refill: Capillary refill takes less than 2 seconds.   Neurological:      Mental Status: She is alert.                  Procedures:  Procedures      Medical Decision Making:      Comorbidities that affect care:    hx preecclampsia and LEEP procedure, Pregnancy    External Notes reviewed:          The following orders were placed and all results were independently analyzed by me:  Orders Placed This Encounter   Procedures   • Urine Culture - Urine,   • US Ob Transvaginal "   • Presto Draw   • hCG, Quantitative, Pregnancy   • CBC Auto Differential   • Comprehensive Metabolic Panel   • Urinalysis With Culture If Indicated - Urine, Clean Catch   • Urinalysis, Microscopic Only - Urine, Clean Catch   • hCG, Quantitative, Pregnancy   • Undress & Gown   • Vital Signs   • Orthostatic Blood Pressure   • Supplies To Bedside - Notify MD When Ready- Pelvic Cart / Set Up   •  RhIg Evaluation   • Doses of Rh Immune Globulin   • CBC & Differential   • Green Top (Gel)   • Lavender Top   • Gold Top - SST   • Light Blue Top       Medications Given in the Emergency Department:  Medications - No data to display     ED Course:    The patient was initially evaluated in the triage area where orders were placed. The patient was later dispositioned by Laina Muñoz PA-C.      The patient was advised to stay for completion of workup which includes but is not limited to communication of labs and radiological results, reassessment and plan. The patient was advised that leaving prior to disposition by a provider could result in critical findings that are not communicated to the patient.          Labs:    Lab Results (last 24 hours)     ** No results found for the last 24 hours. **           Imaging:    US Ob Transvaginal    Result Date: 2023  PROCEDURE: US OB TRANSVAGINAL  COMPARISON: [facility], , US NON-OB TRANSVAGINAL, 2022, 11:27.  INDICATIONS: vaginal bleeding in early pregnancy  TECHNIQUE: Ultrasound examination of the pelvis was performed, using endovaginal technique.   FINDINGS:  Uterus measures 7.6 x 3.6 x 5.1 centimeters.  Right ovary measures 4.3 x 1.5 x 3.5 centimeters.  Right ovarian volume 11.8 cubic centimeters.  Left ovary measures 3.4 x 0.9 x 1.6 centimeters.  Left ovarian volume is 2.6 cubic centimeters.  The endometrium measures 13 millimeters and appears heterogeneous.  A well-defined intrauterine pregnancy is not seen.  No significant uterine lesion is  seen.  The right ovary demonstrates a cystic lesion with peripheral vascularity measuring up to about 1.4 centimeters.  This likely reflects a corpus luteum.  Left ovarian lesion is seen.        1. The endometrium is mildly thickened measuring 13 millimeters.  The endometrium is heterogeneous.  No yolk sac or embryo is clearly evident.  2. By history, quantitative hCG measures 1071 and intrauterine pregnancy would not necessarily be expected to be visualized at this level.  Recommend correlation with serial HCGs and continued attention on short-term follow-up ultrasound. 3. Corpus luteum in the right ovary measuring up to at least 1.4 centimeters.     ANKIT BORGES MD       Electronically Signed and Approved By: ANKIT BORGES MD on 5/22/2023 at 12:14                 Differential Diagnosis and Discussion:      Vaginal Bleeding: Differential diagnosis includes but is not limited to foreign body, tumor, vaginitis, dysfunctional uterine bleeding, endocrine abnormalities, coagulation disorder, systemic illness, polyps, complications of pregnancy (possible ectopic pregnancy).    All labs were reviewed and interpreted by me.  Ultrasound impression was interpreted by me.     MDM         Patient Care Considerations:    CONSULT: I considered consulting OB/GYN, however no emergent indications, no evidence ectopic at this time. expectant management and close f/u for trending HCG. has OB appt 5/30      Consultants/Shared Management Plan:        Social Determinants of Health:    Patient is independent, reliable, and has access to care.       Disposition and Care Coordination:    Discharged: The patient is suitable and stable for discharge with no need for consideration of observation or admission.    CBC without anemia, H&H stable.  hCG is 1071.  No prior on file from this pregnancy to compare to.  CMP is unremarkable, no significant electrolyte abnormality, normal renal hepatic function.  UA with large blood but  noninfectious.  Patient is blood type A+ antibody negative.  RhoGAM not indicated at this time. Ultrasound shows mildly thickened endometrium measuring 13 mm but no yolk sac or embryo is clearly evident.  Corpus luteum in the right ovary measuring 1.4 cm.  Discussed all findings with the patient.  Discussed threatened miscarriage versus normal early pregnancy.  Cannot fully exclude ectopic.  Discussed trending hCG in 48 to 72 hours.  Placed order for hospital laboratory if the patient is unable to have this performed with OB/GYN versus PCP but discussed would be better performed through his offices as they can follow these up.  Has OB appointment in 8 days for ultrasound.  Strict return precaution discussed including bleeding greater than 1 pad per hour, severe pain especially unilateral, passing out.    I have explained the patient´s condition, diagnoses and treatment plan based on the information available to me at this time. I have answered questions and addressed any concerns. The patient has a good  understanding of the patient´s diagnosis, condition, and treatment plan as can be expected at this point. The vital signs have been stable. The patient´s condition is stable and appropriate for discharge from the emergency department.      The patient will pursue further outpatient evaluation with the primary care physician or other designated or consulting physician as outlined in the discharge instructions. They are agreeable to this plan of care and follow-up instructions have been explained in detail. The patient has received these instructions in written format and have expressed an understanding of the discharge instructions. The patient is aware that any significant change in condition or worsening of symptoms should prompt an immediate return to this or the closest emergency department or call to 911.  I have explained discharge medications and the need for follow up with the patient/caretakers. This was  also printed in the discharge instructions. Patient was discharged with the following medications and follow up:      Medication List      No changes were made to your prescriptions during this visit.      Juliet Raza, APRN  [address] [facility] [address]  [phone]          Virginia Rasheed DO  [address] [place] [address]  [phone]    Schedule an appointment as soon as possible for a visit       [facility] EMERGENCY ROOM  [address] [facility] [address] [phone]    If symptoms worsen       Final diagnoses:   Threatened miscarriage in early pregnancy        ED Disposition     ED Disposition   Discharge    Condition   Stable    Comment   --             This medical record created using voice recognition software.           Laina Muñoz PA-C  05/23/23 1125

## 2023-05-23 LAB — BACTERIA SPEC AEROBE CULT: NO GROWTH

## 2023-05-24 ENCOUNTER — LAB (OUTPATIENT)
Dept: LAB | Facility: HOSPITAL | Age: 28
End: 2023-05-24
Payer: COMMERCIAL

## 2023-05-24 DIAGNOSIS — O20.0 THREATENED MISCARRIAGE IN EARLY PREGNANCY: ICD-10-CM

## 2023-05-24 LAB — HCG INTACT+B SERPL-ACNC: 135 MIU/ML

## 2023-05-24 PROCEDURE — 36415 COLL VENOUS BLD VENIPUNCTURE: CPT

## 2023-05-24 PROCEDURE — 84702 CHORIONIC GONADOTROPIN TEST: CPT

## 2023-05-25 ENCOUNTER — TELEPHONE (OUTPATIENT)
Dept: OBSTETRICS AND GYNECOLOGY | Facility: CLINIC | Age: 28
End: 2023-05-25
Payer: COMMERCIAL

## 2023-05-25 DIAGNOSIS — O20.0 THREATENED ABORTION: Primary | ICD-10-CM

## 2023-05-25 NOTE — TELEPHONE ENCOUNTER
Patient called stating she viewed the results of her HCG. She didn't know if she needs to keep the ultrasound scheduled 5/30 or the visit here 6/6. She states she has been bleeding like a period since Saturday with cramping. The cramping has calmed down the past 2 days. Please advise.

## 2023-05-25 NOTE — TELEPHONE ENCOUNTER
Patient informed of Dr. Rasheed's recommendations. She will monitor her pain and bleeding and call to cancel the ultrasound if no issues and do the same for her visit with Dr. Rasheed if again no issues or questions. She is scheduled to repeat the HCG 5/31. Please sign the attached order for the HCG.

## 2023-05-31 ENCOUNTER — LAB (OUTPATIENT)
Dept: OBSTETRICS AND GYNECOLOGY | Facility: CLINIC | Age: 28
End: 2023-05-31

## 2023-05-31 DIAGNOSIS — O20.0 THREATENED ABORTION: ICD-10-CM

## 2023-05-31 LAB — HCG INTACT+B SERPL-ACNC: 4.56 MIU/ML

## 2023-05-31 PROCEDURE — 84702 CHORIONIC GONADOTROPIN TEST: CPT | Performed by: OBSTETRICS & GYNECOLOGY

## 2023-06-14 ENCOUNTER — LAB (OUTPATIENT)
Dept: OBSTETRICS AND GYNECOLOGY | Facility: CLINIC | Age: 28
End: 2023-06-14
Payer: COMMERCIAL

## 2023-06-14 DIAGNOSIS — O20.0 THREATENED ABORTION: ICD-10-CM

## 2023-06-14 LAB — HCG INTACT+B SERPL-ACNC: <0.5 MIU/ML

## 2023-06-14 PROCEDURE — 84702 CHORIONIC GONADOTROPIN TEST: CPT | Performed by: OBSTETRICS & GYNECOLOGY

## 2023-11-01 ENCOUNTER — TELEPHONE (OUTPATIENT)
Dept: OBSTETRICS AND GYNECOLOGY | Facility: CLINIC | Age: 28
End: 2023-11-01
Payer: COMMERCIAL

## 2023-11-01 DIAGNOSIS — Z32.00 ENCOUNTER FOR PREGNANCY TEST, RESULT UNKNOWN: Primary | ICD-10-CM

## 2023-11-01 DIAGNOSIS — O36.80X0 PREGNANCY WITH INCONCLUSIVE FETAL VIABILITY, SINGLE OR UNSPECIFIED FETUS: ICD-10-CM

## 2023-11-01 NOTE — TELEPHONE ENCOUNTER
Provider: DR. DASH    Caller: ALEJANDRO GARCIA    Relationship to Patient: SELF    Pharmacy:     Phone Number: [phone]    Reason for Call: PT IS NEW OB, MISCARRIED IN MAY AND HAD LEEP PROCEDURE DONE 8 YRS AGO, SHE BELIEVES HER LMP IS BET 9-27/9-29 - 1ST AVAIL IS DECEMBER    When was the patient last seen: 02-01-22 MISCARRIED IN MAY 23

## 2023-11-01 NOTE — TELEPHONE ENCOUNTER
Patient called  in this pm.  She talked with someone @ the [facility].  I have attached her note. She has a positive pregnancy test.  She was scheduled RGOB on 12/26/23.  Patient is unsure of her -9-29.  Per protocol I have attached order for lab & Ultrasound.

## 2023-11-06 ENCOUNTER — LAB (OUTPATIENT)
Dept: OBSTETRICS AND GYNECOLOGY | Facility: CLINIC | Age: 28
End: 2023-11-06
Payer: COMMERCIAL

## 2023-11-06 DIAGNOSIS — Z32.00 ENCOUNTER FOR PREGNANCY TEST, RESULT UNKNOWN: ICD-10-CM

## 2023-11-06 LAB — HCG INTACT+B SERPL-ACNC: 269.2 MIU/ML

## 2023-11-06 PROCEDURE — 84702 CHORIONIC GONADOTROPIN TEST: CPT | Performed by: STUDENT IN AN ORGANIZED HEALTH CARE EDUCATION/TRAINING PROGRAM

## 2023-11-08 ENCOUNTER — TELEPHONE (OUTPATIENT)
Dept: OBSTETRICS AND GYNECOLOGY | Facility: CLINIC | Age: 28
End: 2023-11-08
Payer: COMMERCIAL

## 2023-11-08 NOTE — TELEPHONE ENCOUNTER
Caller: Marisol Ku    Relationship: Self    Best call back number: [phone]    What orders are you requesting (i.e. lab or imaging): HCG LABS    In what timeframe would the patient need to come in: NEXT WEEK MONDAY, WEDNESDAY, OR FRIDAY    Where will you receive your lab/imaging services: OFFICE     Additional notes: PATIENT STATED THAT SHE HAD A MISCARRIAGE IN MAY AND HER ANXIETY IS REALLY HIGH//WOULD LIKE TO HAVE THE REPEAT LABS TO MAKE SURE HER NUMBERS ARE INCREASING//PLEASE FOLLOW UP

## 2023-11-09 ENCOUNTER — LAB (OUTPATIENT)
Dept: LAB | Facility: HOSPITAL | Age: 28
End: 2023-11-09
Payer: COMMERCIAL

## 2023-11-09 DIAGNOSIS — O20.0 THREATENED ABORTION: ICD-10-CM

## 2023-11-09 LAB — HCG INTACT+B SERPL-ACNC: 78.5 MIU/ML

## 2023-11-09 PROCEDURE — 84702 CHORIONIC GONADOTROPIN TEST: CPT

## 2023-11-09 PROCEDURE — 36415 COLL VENOUS BLD VENIPUNCTURE: CPT

## 2023-11-09 NOTE — TELEPHONE ENCOUNTER
Patient called in and advised of Dr. Walls's approval to repeat the HCG. She has not noted some increasing spotting/bleeding and did have some more intense cramping overnight. She is heading to the hospital now to go ahead and get the lab collected. Bleeding precautions given and voiced understanding.

## 2023-11-10 ENCOUNTER — TELEPHONE (OUTPATIENT)
Dept: OBSTETRICS AND GYNECOLOGY | Facility: CLINIC | Age: 28
End: 2023-11-10
Payer: COMMERCIAL

## 2023-11-10 DIAGNOSIS — O20.0 THREATENED ABORTION: Primary | ICD-10-CM

## 2023-11-10 NOTE — TELEPHONE ENCOUNTER
----- Message from Virginia Rasheed, DO sent at 11/9/2023  4:59 PM EST -----  hCG consistent with early pregnancy, recommend repeat hCG in 2 to 3 days after this last hcg was drawn.  Please place order.  Strict pain and bleeding precautions.  Please order and schedule ultrasound when approximately 6 weeks gestational age, office visit with me afterwards for confirmation of pregnancy.  Thank you

## 2023-11-11 ENCOUNTER — LAB (OUTPATIENT)
Dept: LAB | Facility: HOSPITAL | Age: 28
End: 2023-11-11
Payer: COMMERCIAL

## 2023-11-11 DIAGNOSIS — O20.0 THREATENED ABORTION: ICD-10-CM

## 2023-11-11 LAB — HCG INTACT+B SERPL-ACNC: 13.08 MIU/ML

## 2023-11-11 PROCEDURE — 36415 COLL VENOUS BLD VENIPUNCTURE: CPT

## 2023-11-11 PROCEDURE — 84702 CHORIONIC GONADOTROPIN TEST: CPT

## 2023-12-01 ENCOUNTER — LAB (OUTPATIENT)
Dept: LAB | Facility: HOSPITAL | Age: 28
End: 2023-12-01
Payer: COMMERCIAL

## 2023-12-01 DIAGNOSIS — O20.0 THREATENED ABORTION: ICD-10-CM

## 2023-12-01 LAB — HCG INTACT+B SERPL-ACNC: <0.5 MIU/ML

## 2023-12-01 PROCEDURE — 84702 CHORIONIC GONADOTROPIN TEST: CPT

## 2023-12-01 PROCEDURE — 36415 COLL VENOUS BLD VENIPUNCTURE: CPT

## 2023-12-05 NOTE — PROGRESS NOTES
"GYN Visit    No chief complaint on file.      HPI:   28 y.o. LMP: No LMP recorded. Patient is pregnant.     Social History     Substance and Sexual Activity   Sexual Activity Yes    Partners: Male    Birth control/protection: None   HCG, Quantitative, Pregnancy (Hospital Lab Only) (2023 14:48)        Menses:   q ***, lasts *** days, changes products q ***hrs on heaviest days.   Pain:  {APPAIN:92191}  {Urinary QUALITY measure 66yo (Optional):18143}  ***    History: PMHx, Meds, Allergies, PSHx, Social Hx, and POBHx all reviewed and updated.    PHYSICAL EXAM:  There were no vitals taken for this visit.  No height and weight on file for this encounter.  General- NAD, alert and oriented, appropriate  Psych- Normal mood, good memory    Neck- No masses, no thyroid enlargement  Cardiovascular- Regular rhythm, no murnurs  Respiratory- CTA to bases, no wheezes  Abdomen- Soft, non distended, non tender, no masses    Breast left-  Bilaterally symmetrical, no masses, non tender, no nipple discharge, no axillary or supraclavicular nodes palpable. {APWWEBREASTOPTIONAL (Optional):79277}  Breast right- Bilaterally symmetrical, no masses, non tender, no nipple discharge, no axillary or supraclavicular nodes palpable. {APWWEBREASTOPTIONAL (Optional):55718}    External genitalia- Normal female, no lesions  Urethra/meatus- Normal, no masses, non tender, no prolapse  Bladder- Normal, no masses, non tender, no prolapse  Vagina- Normal, no atrophy, no lesions, no discharge, no prolapse  Cervix- {APCVX:95161}  Uterus- {APUTERUS:25562} {Bedside US (Optional):59459::\"INDICATION:  size inconsistent with dates\",\"COMPARISON: none\",\"METHOD: Transvaginal\",\"FINDINGS: ***\",\"IMPRESSION: ***\"}  Adnexa- {APADNEXA:86293}  Anus/Rectum/Perineum- {APRECTAL:46598}    Lymphatic- No palpable groin nodes  Extremities- No edema, no cyanosis    Skin- No lesions, no rashes  {APNEXPLANON (Optional):89617}    ASSESSMENT AND PLAN:  There are no " diagnoses linked to this encounter.  {APGYNCOUNSELING (Optional):69450}  {Ectopic/MTX Treatment/FU/Plan (Optional):08495}  Follow Up:  No follow-ups on file.    {Time Spent-Use for E/M Coding REQUIRED IF TELEMED (Optional):39091}  {Separate E+M Time Carve Out (Optional):90048}    Ruchi Encarnacion MA  12/11/2023    [facility]  [facility] [place] [address]  Dept: [phone]  Dept Fax: [phone]  Loc: [phone]  Loc Fax: [phone]

## 2023-12-07 NOTE — PROGRESS NOTES
"GYN Visit    Chief Complaint   Patient presents with    Follow-up       HPI:   28 y.o. LMP: No LMP recorded (lmp unknown).     Social History     Substance and Sexual Activity   Sexual Activity Yes    Partners: Male    Birth control/protection: None     HCG, Quantitative, Pregnancy (Hospital Lab Only) (2023 14:48)    No Fhx SABs   GHTN, del 39+weeks   39weeks uncomplicated  May 2023, very early, BHCG in ER 1000, US no IUP, BHCG dropped to neg w/in 10days.  2023, early BHCG 260, then bled, no US.       History: PMHx, Meds, Allergies, PSHx, Social Hx, and POBHx all reviewed and updated.    PHYSICAL EXAM:  /72   Pulse 85   Ht 152.4 cm (60\")   Wt 57.2 kg (126 lb)   LMP  (LMP Unknown)   Breastfeeding Unknown   BMI 24.61 kg/m²   Facility age limit for growth %luz is 20 years.  General- NAD, alert and oriented, appropriate  Psych- Normal mood, good memory    ASSESSMENT AND PLAN:  Diagnoses and all orders for this visit:    1. History of recurrent miscarriages (Primary)  Overview:  May 2023 anembryonic gestation  2023 c/w early AB/anembryonic gest, no US    Plan vaginal progesterone for first 12 weeks after positive pregnancy test    Orders:  -     Progesterone 100 MG suppository; Insert 1 suppository into the vagina every night at bedtime.  Dispense: 30 each; Refill: 2  -     Anticardiolipin Antibody, IgG / M, Qn  -     Beta-2 Glycoprotein I Antibody,G / M  -     Hemoglobin A1c  -     Lupus Anticoagulant  -     T4, Free  -     TSH    We discussed workup for recurrent miscarriage, patient has had 2 anembryonic gestations and documented pregnancy test positive, controversy with workup with anembryonic gestations reviewed.  Patient does desire blood work.      Recommend start over-the-counter prenatal vitamin.  Discussed social use of marijuana, delta 8-recommend avoid while trying to get pregnant or during pregnancy.  Increased risk of miscarriage reviewed.  Recommend avoid caffeine " also while trying to conceive.      Follow Up:  Return in about 4 weeks (around 1/8/2024) for FU labs.          Virginia Rasheed, DO  12/11/2023    [facility]  [address] [place] [address]  Dept: [phone]  Dept Fax: [phone]  Loc: [phone]  Loc Fax: [phone]

## 2023-12-10 PROBLEM — N96 HISTORY OF RECURRENT MISCARRIAGES: Status: ACTIVE | Noted: 2023-12-10

## 2023-12-11 ENCOUNTER — OFFICE VISIT (OUTPATIENT)
Dept: OBSTETRICS AND GYNECOLOGY | Facility: CLINIC | Age: 28
End: 2023-12-11
Payer: COMMERCIAL

## 2023-12-11 VITALS
DIASTOLIC BLOOD PRESSURE: 72 MMHG | SYSTOLIC BLOOD PRESSURE: 121 MMHG | HEIGHT: 60 IN | BODY MASS INDEX: 24.74 KG/M2 | WEIGHT: 126 LBS | HEART RATE: 85 BPM

## 2023-12-11 DIAGNOSIS — N96 HISTORY OF RECURRENT MISCARRIAGES: Primary | ICD-10-CM

## 2023-12-11 LAB
HBA1C MFR BLD: 4.7 % (ref 4.8–5.6)
T4 FREE SERPL-MCNC: 1.11 NG/DL (ref 0.93–1.7)
TSH SERPL DL<=0.05 MIU/L-ACNC: 1 UIU/ML (ref 0.27–4.2)

## 2023-12-11 PROCEDURE — 83036 HEMOGLOBIN GLYCOSYLATED A1C: CPT | Performed by: OBSTETRICS & GYNECOLOGY

## 2023-12-11 PROCEDURE — 84443 ASSAY THYROID STIM HORMONE: CPT | Performed by: OBSTETRICS & GYNECOLOGY

## 2023-12-11 PROCEDURE — 99213 OFFICE O/P EST LOW 20 MIN: CPT | Performed by: OBSTETRICS & GYNECOLOGY

## 2023-12-11 PROCEDURE — 84439 ASSAY OF FREE THYROXINE: CPT | Performed by: OBSTETRICS & GYNECOLOGY

## 2023-12-11 RX ORDER — EPINEPHRINE 0.3 MG/.3ML
INJECTION SUBCUTANEOUS SEE ADMIN INSTRUCTIONS
COMMUNITY
Start: 2023-11-07

## 2023-12-11 NOTE — PATIENT INSTRUCTIONS
Venipuncture Blood Specimen Collection  Venipuncture performed in right arm by Kelly Colon with good hemostasis. Patient tolerated the procedure well without complications.   12/11/23   Kelly Colon

## 2023-12-13 LAB
CARDIOLIPIN IGG SER IA-ACNC: <9 GPL U/ML (ref 0–14)
CARDIOLIPIN IGM SER IA-ACNC: 10 MPL U/ML (ref 0–12)

## 2023-12-14 LAB
APTT SCREEN TO CONFIRM RATIO: 0.99 RATIO (ref 0–1.34)
B2 GLYCOPROT1 IGG SER-ACNC: <9 GPI IGG UNITS (ref 0–20)
B2 GLYCOPROT1 IGM SER-ACNC: <9 GPI IGM UNITS (ref 0–32)
CONFIRM APTT/NORMAL: 32.5 SEC (ref 0–47.6)
LA 2 SCREEN W REFLEX-IMP: NORMAL
SCREEN APTT: 34.9 SEC (ref 0–43.5)
SCREEN DRVVT: 32.6 SEC (ref 0–47)
THROMBIN TIME: 18.8 SEC (ref 0–23)

## 2024-02-26 ENCOUNTER — TELEPHONE (OUTPATIENT)
Dept: OBSTETRICS AND GYNECOLOGY | Facility: CLINIC | Age: 29
End: 2024-02-26
Payer: COMMERCIAL

## 2024-02-26 DIAGNOSIS — N91.2 AMENORRHEA: Primary | ICD-10-CM

## 2024-02-27 ENCOUNTER — LAB (OUTPATIENT)
Dept: OBSTETRICS AND GYNECOLOGY | Facility: CLINIC | Age: 29
End: 2024-02-27
Payer: COMMERCIAL

## 2024-02-27 DIAGNOSIS — N91.2 AMENORRHEA: ICD-10-CM

## 2024-02-28 ENCOUNTER — TELEPHONE (OUTPATIENT)
Dept: OBSTETRICS AND GYNECOLOGY | Facility: CLINIC | Age: 29
End: 2024-02-28
Payer: COMMERCIAL

## 2024-02-28 DIAGNOSIS — N96 HISTORY OF RECURRENT MISCARRIAGES: Primary | ICD-10-CM

## 2024-02-28 RX ORDER — PROGESTERONE 100 MG/1
100 CAPSULE ORAL
Qty: 30 CAPSULE | Refills: 2 | Status: SHIPPED | OUTPATIENT
Start: 2024-02-28

## 2024-02-28 NOTE — TELEPHONE ENCOUNTER
The pt called and states that she started having vaginal burning after inserting the vaginal progesterone suppositories and it is unbearable.  I advised her to discontinue use.  She would like to have a different form of progesterone if possible.  Please advise.  Pt is aware that you are not in the office tomorrow and she will need to discontinue use until she hears back from the office.  Thanks.

## 2024-02-29 LAB — HCG INTACT+B SERPL-ACNC: 2288 MIU/ML

## 2024-02-29 NOTE — TELEPHONE ENCOUNTER
The patient called and said she will try and continue with the suppositories because she thinks the irritation happened because she didn't realize that she had used some that were .  She said she placed one today that was not  and has not had any vaginal irritation.  I told her to call us if she no longer feels comfortable continuing with the suppositories since she was adamant in still using that method for right now.  I have told the pt also about her quant results and she is going to go to the lab on [address] to have it repeated tomorrow.  I told her that once the result is received, we will make the necessary steps toward getting her ultrasound and appointment scheduled.  The patient understood this information.

## 2024-03-01 ENCOUNTER — LAB (OUTPATIENT)
Dept: LAB | Facility: HOSPITAL | Age: 29
End: 2024-03-01
Payer: COMMERCIAL

## 2024-03-01 ENCOUNTER — PATIENT MESSAGE (OUTPATIENT)
Dept: OBSTETRICS AND GYNECOLOGY | Facility: CLINIC | Age: 29
End: 2024-03-01
Payer: COMMERCIAL

## 2024-03-01 DIAGNOSIS — N96 HISTORY OF RECURRENT MISCARRIAGES: ICD-10-CM

## 2024-03-01 LAB — HCG INTACT+B SERPL-ACNC: 6155 MIU/ML

## 2024-03-01 PROCEDURE — 36415 COLL VENOUS BLD VENIPUNCTURE: CPT

## 2024-03-01 PROCEDURE — 84702 CHORIONIC GONADOTROPIN TEST: CPT

## 2024-03-01 NOTE — TELEPHONE ENCOUNTER
From: Marisol Ku  To: Virginia Rasheed  Sent: 3/1/2024 9:13 AM EST  Subject: Working out while pregnant?    How do you feel about me continuing to work out? I work out 3-4 days a week and do mostly strength training. 15-25 lbs in each hand. I know some of my workouts with much heavier weight when I squat and stuff like that will more than likely be out, but I would still like to continue working out for the most part, but I wanted to talk to you about it. I know typically as long as you are working out before you get pregnant. You can mostly continue doing the same things but with my history, I’m a little bit more nervous.

## 2024-03-04 DIAGNOSIS — O36.80X0 PREGNANCY OF UNKNOWN ANATOMIC LOCATION: Primary | ICD-10-CM

## 2024-03-04 NOTE — PROGRESS NOTES
Discussed [facility] results and recommendations for us with patient.  Appointment has been schedule.

## 2024-03-08 ENCOUNTER — TELEPHONE (OUTPATIENT)
Dept: OBSTETRICS AND GYNECOLOGY | Facility: CLINIC | Age: 29
End: 2024-03-08

## 2024-03-08 NOTE — TELEPHONE ENCOUNTER
Spoke with pt and advised she does not need to repeat her hcg as long as she is not having any issues she can just keep her ultrasound and follow up on 03/15.

## 2024-03-08 NOTE — TELEPHONE ENCOUNTER
Provider: DR DASH    Caller: ALEJANDRO GARCIA    Relationship to Patient: SELF    Phone Number: [phone]    Reason for Call: PT WANTS TO KNOW IF SHE NEEDS TO GET LABS DONE BEFORE NEXT APPT ON 03/15/24. IF SHE DOES WHERE DOES SHE NEED TO GO?    PT WOULD LIKE A CALL BACK.

## 2024-03-11 NOTE — PROGRESS NOTES
"GYN Visit    Chief Complaint   Patient presents with    Follow-up     FUUS for viability       HPI:   28 y.o. LMP: No LMP recorded (lmp unknown). Patient is pregnant.     Social History     Substance and Sexual Activity   Sexual Activity Yes    Partners: Male    Birth control/protection: None     HCG, Quantitative, Pregnancy (Hospital Lab Only) (2024 10:34) hCG 6100+    Hx recurrent SAB, using vag progesterone   Ultrasound today:viable IUP 7+3    Unsure LMP, end of .     Constant nausea and indigestion  Spotting early on, none now    History: PMHx, Meds, Allergies, PSHx, Social Hx, and POBHx all reviewed and updated.    PHYSICAL EXAM:  /78   Pulse 86   Ht 152.4 cm (60\")   Wt 56.4 kg (124 lb 6.4 oz)   LMP  (LMP Unknown)   Breastfeeding No   BMI 24.30 kg/m²   Facility age limit for growth %luz is 20 years.   General- NAD, alert and oriented, appropriate  Psych- Normal mood, good memory      ASSESSMENT AND PLAN:  Diagnoses and all orders for this visit:    1. Viable pregnancy in first trimester (Primary)    2. History of recurrent miscarriages  Overview:  May 2023 anembryonic gestation  2023 c/w early AB/anembryonic gest, no US  Dec 2023 nl TFTs and A1C, nl SIDDHARTH IgG and IgM, nl [id number], neg LA    Plan vaginal progesterone for first 12 weeks after positive pregnancy test      3. Nausea and vomiting in pregnancy  -     doxylamine (UNISOM) 25 MG tablet; Take 1 tablet by mouth At Night As Needed for Sleep or Nausea (or anxiety). May take and extra 1/2 tablet PRN persistent nausea or anxiety  Dispense: 30 tablet; Refill: 1  -     vitamin B-6 (PYRIDOXINE) 25 MG tablet; Take 1 tablet by mouth 2 (Two) Times a Day As Needed (Nausea).  Dispense: 60 tablet; Refill: 1    4. Gastroesophageal reflux disease without esophagitis  -     famotidine (Pepcid) 20 MG tablet; Take 1 tablet by mouth 2 (Two) Times a Day As Needed for Heartburn.  Dispense: 60 tablet; Refill: 1    Recommend avoiding large " meals, do not lie down after eating for at least 2 hours, and avoid foods that worsen reflux and indigestion      Follow Up:  Return in about 4 weeks (around 4/12/2024) for New OB, then OV 4weeks later.          Virginia Rasheed,   03/15/2024    [facility]  [address] [place] [address]  Dept: [phone]  Dept Fax: [phone]  Loc: [phone]  Loc Fax: [phone]

## 2024-03-15 ENCOUNTER — OFFICE VISIT (OUTPATIENT)
Dept: OBSTETRICS AND GYNECOLOGY | Facility: CLINIC | Age: 29
End: 2024-03-15
Payer: COMMERCIAL

## 2024-03-15 VITALS
HEIGHT: 60 IN | WEIGHT: 124.4 LBS | HEART RATE: 86 BPM | SYSTOLIC BLOOD PRESSURE: 122 MMHG | DIASTOLIC BLOOD PRESSURE: 78 MMHG | BODY MASS INDEX: 24.42 KG/M2

## 2024-03-15 DIAGNOSIS — O21.9 NAUSEA AND VOMITING IN PREGNANCY: ICD-10-CM

## 2024-03-15 DIAGNOSIS — K21.9 GASTROESOPHAGEAL REFLUX DISEASE WITHOUT ESOPHAGITIS: ICD-10-CM

## 2024-03-15 DIAGNOSIS — Z34.91 VIABLE PREGNANCY IN FIRST TRIMESTER: Primary | ICD-10-CM

## 2024-03-15 DIAGNOSIS — N96 HISTORY OF RECURRENT MISCARRIAGES: ICD-10-CM

## 2024-03-15 PROBLEM — Z34.80 SUPERVISION OF OTHER NORMAL PREGNANCY, ANTEPARTUM: Status: ACTIVE | Noted: 2024-03-15

## 2024-03-15 RX ORDER — FAMOTIDINE 20 MG/1
20 TABLET, FILM COATED ORAL 2 TIMES DAILY PRN
Qty: 60 TABLET | Refills: 1 | Status: SHIPPED | OUTPATIENT
Start: 2024-03-15

## 2024-03-15 RX ORDER — PRENATAL VIT NO.126/IRON/FOLIC 28MG-0.8MG
TABLET ORAL DAILY
COMMUNITY

## 2024-03-15 RX ORDER — DIPHENHYDRAMINE HYDROCHLORIDE 25 MG/1
25 CAPSULE ORAL 2 TIMES DAILY PRN
Qty: 60 TABLET | Refills: 1 | Status: SHIPPED | OUTPATIENT
Start: 2024-03-15

## 2024-03-25 ENCOUNTER — TELEPHONE (OUTPATIENT)
Dept: OBSTETRICS AND GYNECOLOGY | Facility: CLINIC | Age: 29
End: 2024-03-25
Payer: COMMERCIAL

## 2024-03-25 NOTE — TELEPHONE ENCOUNTER
"Spoke to the patient and she denied any bleeding, cramping, or other concerns. Discussed that the lack of \"pregnancy symptoms\" does not mean that anything is wrong. Discussed what to watch for and voiced understanding. She was advised to monitor and call if any concerns or changes.   "

## 2024-03-25 NOTE — TELEPHONE ENCOUNTER
"Provider: Katerin MURILLO    Caller: ALEJANDRO GARCIA    Relationship to Patient: SELF    Pharmacy:     Phone Number: [phone]    Reason for Call: PT ADV SHE HAS HAD 2X EARLY MISCARRIAGES LAST YEAR. SAYS LAST WEEK SHE SPOTTED A LITTLE. NO LONGER SPOTTING, BUT IS NOT HAVING ANY \"PREGNANCY\" SYMPTOMS AND IS CONCERNED    When was the patient last seen: 03-15-24      "

## 2024-03-26 ENCOUNTER — TELEPHONE (OUTPATIENT)
Dept: OBSTETRICS AND GYNECOLOGY | Facility: CLINIC | Age: 29
End: 2024-03-26
Payer: COMMERCIAL

## 2024-03-26 NOTE — TELEPHONE ENCOUNTER
Vladimir's requesting Progesterone ( Prometrium) 100mg Suppositories instead of Capsules.  2/28/24 Progesterone ( Prometrium ) # 30 with 2 refills.  Okay'd change from Capsules to Suppository

## 2024-04-17 DIAGNOSIS — O21.9 NAUSEA AND VOMITING IN PREGNANCY: ICD-10-CM

## 2024-04-17 RX ORDER — DIPHENHYDRAMINE HYDROCHLORIDE 25 MG/1
CAPSULE ORAL
Qty: 90 TABLET | Refills: 4 | Status: SHIPPED | OUTPATIENT
Start: 2024-04-17

## 2024-04-17 RX ORDER — DOXYLAMINE SUCCINATE 25 MG/1
TABLET ORAL
Qty: 45 TABLET | Refills: 4 | Status: SHIPPED | OUTPATIENT
Start: 2024-04-17

## 2024-04-17 NOTE — TELEPHONE ENCOUNTER
Okjose'd refill on Doxylamine & Pyridoxine per protocol.  Last seen 3/15/24.  Next appointment 4/19/24

## 2024-04-18 PROBLEM — Z87.59 HISTORY OF GESTATIONAL HYPERTENSION: Status: ACTIVE | Noted: 2024-04-18

## 2024-04-18 PROBLEM — Z98.890 HISTORY OF LOOP ELECTRICAL EXCISION PROCEDURE (LEEP): Status: ACTIVE | Noted: 2024-04-18

## 2024-04-19 ENCOUNTER — LAB (OUTPATIENT)
Dept: LAB | Facility: HOSPITAL | Age: 29
End: 2024-04-19
Payer: COMMERCIAL

## 2024-04-19 ENCOUNTER — INITIAL PRENATAL (OUTPATIENT)
Dept: OBSTETRICS AND GYNECOLOGY | Facility: CLINIC | Age: 29
End: 2024-04-19
Payer: COMMERCIAL

## 2024-04-19 VITALS — DIASTOLIC BLOOD PRESSURE: 71 MMHG | WEIGHT: 128 LBS | SYSTOLIC BLOOD PRESSURE: 112 MMHG | BODY MASS INDEX: 25 KG/M2

## 2024-04-19 DIAGNOSIS — N91.2 AMENORRHEA: ICD-10-CM

## 2024-04-19 DIAGNOSIS — Z87.59 HISTORY OF GESTATIONAL HYPERTENSION: ICD-10-CM

## 2024-04-19 DIAGNOSIS — Z98.890 HISTORY OF LOOP ELECTRICAL EXCISION PROCEDURE (LEEP): ICD-10-CM

## 2024-04-19 DIAGNOSIS — Z34.80 SUPERVISION OF OTHER NORMAL PREGNANCY, ANTEPARTUM: Primary | ICD-10-CM

## 2024-04-19 LAB
ABO GROUP BLD: NORMAL
ALBUMIN SERPL-MCNC: 4.6 G/DL (ref 3.5–5.2)
ALBUMIN/GLOB SERPL: 1.8 G/DL
ALP SERPL-CCNC: 40 U/L (ref 39–117)
ALT SERPL W P-5'-P-CCNC: 16 U/L (ref 1–33)
AMPHET+METHAMPHET UR QL: NEGATIVE
ANION GAP SERPL CALCULATED.3IONS-SCNC: 13 MMOL/L (ref 5–15)
AST SERPL-CCNC: 14 U/L (ref 1–32)
BARBITURATES UR QL SCN: NEGATIVE
BENZODIAZ UR QL SCN: NEGATIVE
BILIRUB SERPL-MCNC: 0.3 MG/DL (ref 0–1.2)
BLD GP AB SCN SERPL QL: NEGATIVE
BUN SERPL-MCNC: 9 MG/DL (ref 6–20)
BUN/CREAT SERPL: 13.6 (ref 7–25)
C TRACH RRNA CVX QL NAA+PROBE: NOT DETECTED
CALCIUM SPEC-SCNC: 9.4 MG/DL (ref 8.6–10.5)
CANNABINOIDS SERPL QL: NEGATIVE
CHLORIDE SERPL-SCNC: 101 MMOL/L (ref 98–107)
CO2 SERPL-SCNC: 22 MMOL/L (ref 22–29)
COCAINE UR QL: NEGATIVE
CREAT SERPL-MCNC: 0.66 MG/DL (ref 0.57–1)
CREAT UR-MCNC: 43.9 MG/DL
EGFRCR SERPLBLD CKD-EPI 2021: 122 ML/MIN/1.73
FENTANYL UR-MCNC: NEGATIVE NG/ML
GLOBULIN UR ELPH-MCNC: 2.6 GM/DL
GLUCOSE SERPL-MCNC: 77 MG/DL (ref 65–99)
GLUCOSE UR STRIP-MCNC: NEGATIVE MG/DL
HBV SURFACE AG SERPL QL IA: NORMAL
HCV AB SER QL: NORMAL
HIV 1+2 AB+HIV1 P24 AG SERPL QL IA: NORMAL
METHADONE UR QL SCN: NEGATIVE
N GONORRHOEA RRNA SPEC QL NAA+PROBE: NOT DETECTED
OPIATES UR QL: NEGATIVE
OXYCODONE UR QL SCN: NEGATIVE
POTASSIUM SERPL-SCNC: 4 MMOL/L (ref 3.5–5.2)
PROT ?TM UR-MCNC: 4.9 MG/DL
PROT SERPL-MCNC: 7.2 G/DL (ref 6–8.5)
PROT UR STRIP-MCNC: NEGATIVE MG/DL
PROT/CREAT UR: 0.11 MG/G{CREAT}
RH BLD: POSITIVE
SODIUM SERPL-SCNC: 136 MMOL/L (ref 136–145)
T PALLIDUM IGG SER QL: NORMAL

## 2024-04-19 PROCEDURE — 80307 DRUG TEST PRSMV CHEM ANLYZR: CPT | Performed by: OBSTETRICS & GYNECOLOGY

## 2024-04-19 PROCEDURE — 84702 CHORIONIC GONADOTROPIN TEST: CPT

## 2024-04-19 PROCEDURE — 87591 N.GONORRHOEAE DNA AMP PROB: CPT | Performed by: OBSTETRICS & GYNECOLOGY

## 2024-04-19 PROCEDURE — 87491 CHLMYD TRACH DNA AMP PROBE: CPT | Performed by: OBSTETRICS & GYNECOLOGY

## 2024-04-19 PROCEDURE — 82570 ASSAY OF URINE CREATININE: CPT | Performed by: OBSTETRICS & GYNECOLOGY

## 2024-04-19 PROCEDURE — 87340 HEPATITIS B SURFACE AG IA: CPT | Performed by: OBSTETRICS & GYNECOLOGY

## 2024-04-19 PROCEDURE — 80053 COMPREHEN METABOLIC PANEL: CPT | Performed by: OBSTETRICS & GYNECOLOGY

## 2024-04-19 PROCEDURE — 36415 COLL VENOUS BLD VENIPUNCTURE: CPT | Performed by: OBSTETRICS & GYNECOLOGY

## 2024-04-19 PROCEDURE — 86780 TREPONEMA PALLIDUM: CPT | Performed by: OBSTETRICS & GYNECOLOGY

## 2024-04-19 PROCEDURE — 86803 HEPATITIS C AB TEST: CPT | Performed by: OBSTETRICS & GYNECOLOGY

## 2024-04-19 PROCEDURE — 86850 RBC ANTIBODY SCREEN: CPT | Performed by: OBSTETRICS & GYNECOLOGY

## 2024-04-19 PROCEDURE — 87086 URINE CULTURE/COLONY COUNT: CPT | Performed by: OBSTETRICS & GYNECOLOGY

## 2024-04-19 PROCEDURE — 86762 RUBELLA ANTIBODY: CPT | Performed by: OBSTETRICS & GYNECOLOGY

## 2024-04-19 PROCEDURE — G0432 EIA HIV-1/HIV-2 SCREEN: HCPCS | Performed by: OBSTETRICS & GYNECOLOGY

## 2024-04-19 PROCEDURE — 84156 ASSAY OF PROTEIN URINE: CPT | Performed by: OBSTETRICS & GYNECOLOGY

## 2024-04-19 PROCEDURE — G0123 SCREEN CERV/VAG THIN LAYER: HCPCS | Performed by: OBSTETRICS & GYNECOLOGY

## 2024-04-19 PROCEDURE — 86901 BLOOD TYPING SEROLOGIC RH(D): CPT | Performed by: OBSTETRICS & GYNECOLOGY

## 2024-04-19 PROCEDURE — 86900 BLOOD TYPING SEROLOGIC ABO: CPT | Performed by: OBSTETRICS & GYNECOLOGY

## 2024-04-19 RX ORDER — ASPIRIN 81 MG/1
81 TABLET ORAL DAILY
Qty: 90 TABLET | Refills: 1 | Status: SHIPPED | OUTPATIENT
Start: 2024-04-19

## 2024-04-20 LAB
HCG INTACT+B SERPL-ACNC: NORMAL MIU/ML
RUBV IGG SERPL IA-ACNC: 1.15 INDEX

## 2024-04-21 LAB — BACTERIA SPEC AEROBE CULT: NO GROWTH

## 2024-04-23 LAB
CONV .: NORMAL
CYTOLOGIST CVX/VAG CYTO: NORMAL
CYTOLOGY CVX/VAG DOC CYTO: NORMAL
CYTOLOGY CVX/VAG DOC THIN PREP: NORMAL
DX ICD CODE: NORMAL
Lab: NORMAL
OTHER STN SPEC: NORMAL
STAT OF ADQ CVX/VAG CYTO-IMP: NORMAL

## 2024-05-10 NOTE — PROGRESS NOTES
OB FOLLOW UP      Chief Complaint   Patient presents with    Routine Prenatal Visit     Subjective:   Doxylamine helps w insomnia, needs refill  CBC never drawn at NEW OB panel at [facility]    Objective:  /56   Wt 61.8 kg (136 lb 3.2 oz)   LMP  (LMP Unknown)   BMI 26.60 kg/m²  3.719 kg (8 lb 3.2 oz) Facility age limit for growth %luz is 20 years.  See OB flow sheet for fundal height (not performed if US day of OV), FHT, edema, cvx exam if performed, and Upro/Uglu  Chaperone present during pelvic exam if performed.      Assessment and Plan:  16w0d     Diagnoses and all orders for this visit:    1. Supervision of other normal pregnancy, antepartum (Primary)  Overview:  TONYA finalized: 10/29/24 by 7-week ultrasound, LMP unknown    Optional testing NIPS,CF/SMA,AFP:  Declined all     COVID: Recommended, declines  Flu:  Recommended, 2024 plans to wait until 24-25 season  Tdap:  RSV:    28-32 weeks repeat TPA:  ? Desires Sterilization:    Anatomy US: [facility] 24  FU US:    PROBLEM LIST/PLAN:   Hx GHTN/pre-e - baseline labs at new OB visit nl CMP, UPC 0.11, baby aspirin s/p Rx- continue  Hx LEEP- with 2 subsequent 's at 39+ weeks-plan cervical length of anatomy ultrasound only     Hx recurrent SAB- s/p progesterone [id number]    Orders:  -     POC Urinalysis Dipstick  -     CBC (No Diff)    2. Insomnia, unspecified type  -     doxylamine (UNISOM) 25 MG tablet; Take 1 tablet by mouth At Night As Needed for Sleep or Nausea (or anxiety). May take and extra 1/2 tablet PRN persistent nausea or anxiety  Dispense: 30 tablet; Refill: 1      Counseling:    Second trimester precautions  CBC today since wasn't drawn w new OB panel as ordered    Reassuring pregnancy progress. Questions answered.  Continue PNV.  Importance of healthy eating, obtaining sufficient sleep, and staying active unless hypertensive- activity modified as directed.    Return in about 4 weeks (around 2024) for FU OB w anatomy US, then OV 4weeks  later w travis.            Virginia Rasheed,   05/14/2024    [facility] [place] [facility]  [address] [place] [address]  Dept: [phone]  Dept Fax: [phone]  Loc: [phone]  Loc Fax: [phone]

## 2024-05-14 ENCOUNTER — ROUTINE PRENATAL (OUTPATIENT)
Dept: OBSTETRICS AND GYNECOLOGY | Facility: CLINIC | Age: 29
End: 2024-05-14
Payer: COMMERCIAL

## 2024-05-14 VITALS — SYSTOLIC BLOOD PRESSURE: 127 MMHG | DIASTOLIC BLOOD PRESSURE: 56 MMHG | BODY MASS INDEX: 26.6 KG/M2 | WEIGHT: 136.2 LBS

## 2024-05-14 DIAGNOSIS — Z34.80 SUPERVISION OF OTHER NORMAL PREGNANCY, ANTEPARTUM: Primary | ICD-10-CM

## 2024-05-14 DIAGNOSIS — G47.00 INSOMNIA, UNSPECIFIED TYPE: ICD-10-CM

## 2024-05-14 LAB
DEPRECATED RDW RBC AUTO: 40.4 FL (ref 37–54)
ERYTHROCYTE [DISTWIDTH] IN BLOOD BY AUTOMATED COUNT: 13 % (ref 12.3–15.4)
GLUCOSE UR STRIP-MCNC: NEGATIVE MG/DL
HCT VFR BLD AUTO: 36.1 % (ref 34–46.6)
HGB BLD-MCNC: 12.3 G/DL (ref 12–15.9)
MCH RBC QN AUTO: 29 PG (ref 26.6–33)
MCHC RBC AUTO-ENTMCNC: 34.1 G/DL (ref 31.5–35.7)
MCV RBC AUTO: 85.1 FL (ref 79–97)
PLATELET # BLD AUTO: 314 10*3/MM3 (ref 140–450)
PMV BLD AUTO: 8.5 FL (ref 6–12)
PROT UR STRIP-MCNC: NEGATIVE MG/DL
RBC # BLD AUTO: 4.24 10*6/MM3 (ref 3.77–5.28)
WBC NRBC COR # BLD AUTO: 10.84 10*3/MM3 (ref 3.4–10.8)

## 2024-05-14 PROCEDURE — 85027 COMPLETE CBC AUTOMATED: CPT | Performed by: OBSTETRICS & GYNECOLOGY

## 2024-05-29 ENCOUNTER — TELEPHONE (OUTPATIENT)
Dept: OBSTETRICS AND GYNECOLOGY | Facility: CLINIC | Age: 29
End: 2024-05-29
Payer: COMMERCIAL

## 2024-05-29 NOTE — TELEPHONE ENCOUNTER
**05/29/24 left message for pt to call back & r/s due to  Dr. Rasheed had call schedule change & not in off now, on 07 11 2024.  At time of call has openings on 07/09/2024.mp

## 2024-06-10 NOTE — PROGRESS NOTES
OB FOLLOW UP      Chief Complaint   Patient presents with    Routine Prenatal Visit     Subjective:   Occas HA, gone w tylenol    Objective:  /67   Wt 64.4 kg (142 lb)   LMP  (LMP Unknown)   BMI 27.73 kg/m²  6.35 kg (14 lb) Facility age limit for growth %luz is 20 years.  See OB flow sheet for fundal height (not performed if US day of OV), FHT, edema, cvx exam if performed, and Upro/Uglu  Chaperone present during pelvic exam if performed.      Assessment and Plan:  20w3d     Diagnoses and all orders for this visit:    1. Supervision of other normal pregnancy, antepartum (Primary)  Overview:  TONYA finalized: 10/29/24 by 7-week ultrasound, LMP unknown    Optional testing NIPS,CF/SMA,AFP:  Declined all     COVID: Recommended, declines  Tdap:  RSV:  Flu:    28-32 weeks repeat TPA:  ? Desires Sterilization:    Anatomy US: [facility] 24 consistent w dates, cvx length 4.5cm, ant placenta, VTX, completed and wnl    PROBLEM LIST/PLAN:   Hx GHTN/pre-e - baseline labs at new OB visit nl CMP, UPC 0.11, baby aspirin s/p Rx- continue  Hx LEEP- with 2 subsequent 's at 39+ weeks-plan cervical length wnl    Hx recurrent SAB- s/p progesterone [id number]    Orders:  -     POC Urinalysis Dipstick      Counseling:    Second trimester precautions  HEADACHES in pregnancy- We discussed safe options including Tylenol and caffeine.  Option add phenergan or reglan to tylenol.  Declines Rx for now      Reassuring pregnancy progress. Questions answered.  Continue PNV.  Importance of healthy eating, obtaining sufficient sleep, and staying active unless hypertensive- activity modified as directed.    Return in about 4 weeks (around 2024) for FU OB x2, next one w glucola.            Virginia Rasheed, DO  2024    [facility] [place] [facility]  [address] [place] [address]  Dept: [phone]  Dept Fax: [phone]  Loc: [phone]  Loc Fax: [phone]

## 2024-06-14 ENCOUNTER — ROUTINE PRENATAL (OUTPATIENT)
Dept: OBSTETRICS AND GYNECOLOGY | Facility: CLINIC | Age: 29
End: 2024-06-14
Payer: COMMERCIAL

## 2024-06-14 VITALS — WEIGHT: 142 LBS | BODY MASS INDEX: 27.73 KG/M2 | SYSTOLIC BLOOD PRESSURE: 107 MMHG | DIASTOLIC BLOOD PRESSURE: 67 MMHG

## 2024-06-14 DIAGNOSIS — Z34.80 SUPERVISION OF OTHER NORMAL PREGNANCY, ANTEPARTUM: Primary | ICD-10-CM

## 2024-06-14 LAB
GLUCOSE UR STRIP-MCNC: NEGATIVE MG/DL
PROT UR STRIP-MCNC: NEGATIVE MG/DL

## 2024-07-11 NOTE — PROGRESS NOTES
OB FOLLOW UP      Chief Complaint   Patient presents with    Routine Prenatal Visit     Subjective:   No complaints    Objective:  /61   Wt 65.8 kg (145 lb)   LMP  (LMP Unknown)   BMI 28.32 kg/m²  7.711 kg (17 lb) Body mass index is 28.32 kg/m².  See OB flow sheet for fundal height (not performed if US day of OV), FHT, edema, cvx exam if performed, and Upro/Uglu. Chaperone present during pelvic exam if performed.      Assessment and Plan:  24w6d     Diagnoses and all orders for this visit:    1. Supervision of other normal pregnancy, antepartum (Primary)  Overview:  TONYA finalized: 10/29/24 by 7-week ultrasound, LMP unknown    Optional testing NIPS,CF/SMA,AFP:  Declined all     COVID: Recommended, declines  Tdap: Recommended, s/p Rx 7/15/2024   RSV:  Flu:    28-32 weeks repeat TPA: 7/15/2024   ? Desires Sterilization: Plans vas, considering tubal if CS 7/15/2024     Anatomy US: [facility] 24 consistent w dates, cvx length 4.5cm, ant placenta, VTX, completed and wnl    PROBLEM LIST/PLAN:   Hx GHTN/pre-e - baseline labs at new OB visit nl CMP, UPC 0.11, baby aspirin s/p Rx- continue  Hx LEEP- with 2 subsequent 's at 39+ weeks-cervical length wnl at anatomy    Hx recurrent SAB- s/p progesterone [id number]    Orders:  -     POC Urinalysis Dipstick  -     CBC (No Diff)  -     Gestational Diabetes Screen 1 Hour  -     Treponema pallidum AB w/Reflex RPR      Counseling:    OB precautions, leaking, VB, sofía rico vs PTL/Labor  FKC    Reassuring pregnancy progress. Questions answered.  Continue PNV.  Importance of healthy eating, obtaining sufficient sleep, and staying active unless hypertensive- activity modified as directed.    Return for FU OB 3-4weeks, then [id number] x2.            Virginia Rasheed,   07/15/2024    [facility] [place] [facility]  [facility] [place] [address]  Dept: [phone]  Dept Fax: [phone]  Loc: [phone]  Loc Fax: [phone]

## 2024-07-15 ENCOUNTER — ROUTINE PRENATAL (OUTPATIENT)
Dept: OBSTETRICS AND GYNECOLOGY | Facility: CLINIC | Age: 29
End: 2024-07-15
Payer: COMMERCIAL

## 2024-07-15 VITALS — WEIGHT: 145 LBS | BODY MASS INDEX: 28.32 KG/M2 | DIASTOLIC BLOOD PRESSURE: 61 MMHG | SYSTOLIC BLOOD PRESSURE: 106 MMHG

## 2024-07-15 DIAGNOSIS — Z34.80 SUPERVISION OF OTHER NORMAL PREGNANCY, ANTEPARTUM: Primary | ICD-10-CM

## 2024-07-15 LAB
DEPRECATED RDW RBC AUTO: 38.4 FL (ref 37–54)
ERYTHROCYTE [DISTWIDTH] IN BLOOD BY AUTOMATED COUNT: 12.3 % (ref 12.3–15.4)
GLUCOSE 1H P GLC SERPL-MCNC: 69 MG/DL (ref 65–139)
GLUCOSE UR STRIP-MCNC: NEGATIVE MG/DL
HCT VFR BLD AUTO: 35.8 % (ref 34–46.6)
HGB BLD-MCNC: 12.1 G/DL (ref 12–15.9)
MCH RBC QN AUTO: 29.4 PG (ref 26.6–33)
MCHC RBC AUTO-ENTMCNC: 33.8 G/DL (ref 31.5–35.7)
MCV RBC AUTO: 87.1 FL (ref 79–97)
PLATELET # BLD AUTO: 281 10*3/MM3 (ref 140–450)
PMV BLD AUTO: 8.3 FL (ref 6–12)
PROT UR STRIP-MCNC: NEGATIVE MG/DL
RBC # BLD AUTO: 4.11 10*6/MM3 (ref 3.77–5.28)
TREPONEMA PALLIDUM IGG+IGM AB [PRESENCE] IN SERUM OR PLASMA BY IMMUNOASSAY: NORMAL
WBC NRBC COR # BLD AUTO: 8.34 10*3/MM3 (ref 3.4–10.8)

## 2024-07-15 PROCEDURE — 86780 TREPONEMA PALLIDUM: CPT | Performed by: OBSTETRICS & GYNECOLOGY

## 2024-07-15 PROCEDURE — 85027 COMPLETE CBC AUTOMATED: CPT | Performed by: OBSTETRICS & GYNECOLOGY

## 2024-07-15 PROCEDURE — 82950 GLUCOSE TEST: CPT | Performed by: OBSTETRICS & GYNECOLOGY

## 2024-07-15 PROCEDURE — 0502F SUBSEQUENT PRENATAL CARE: CPT | Performed by: OBSTETRICS & GYNECOLOGY

## 2024-07-29 NOTE — PROGRESS NOTES
OB FOLLOW UP      Chief Complaint   Patient presents with    Routine Prenatal Visit     Subjective:   No complaints    Objective:  /75   Wt 68.6 kg (151 lb 3.2 oz)   LMP  (LMP Unknown)   BMI 29.53 kg/m²  10.5 kg (23 lb 3.2 oz) Body mass index is 29.53 kg/m².  See OB flow sheet for fundal height (not performed if US day of OV), FHT, edema, cvx exam if performed, and Upro/Uglu. Chaperone present during pelvic exam if performed.      Assessment and Plan:  27w6d     Diagnoses and all orders for this visit:    1. Supervision of other normal pregnancy, antepartum (Primary)  Overview:  TONYA finalized: 10/29/24 by 7-week ultrasound, LMP unknown    Optional testing NIPS,CF/SMA,AFP:  Declined all     COVID: Recommended, declines  Tdap: Recommended, s/p Rx   RSV:  Flu:    FU TPA: NEG  ? Desires Sterilization: Plans vas, considering tubal if CS 7/15/2024     Anatomy US: [facility] 24 consistent w dates, cvx length 4.5cm, ant placenta, VTX, completed and wnl    PROBLEM LIST/PLAN:   Hx GHTN/pre-e - baseline labs at new OB visit nl CMP, UPC 0.11,    81mg aspirin s/p Rx- continue    Hx LEEP- with 2 subsequent 's at 39+ weeks-cervical length wnl at anatomy  Hx recurrent SAB- s/p progesterone [id number]    Orders:  -     POC Urinalysis Dipstick      Counseling:    OB precautions, leaking, VB, sofía rico vs PTL/Labor  FKC    Reassuring pregnancy progress. Questions answered.  Continue PNV.  Importance of healthy eating, obtaining sufficient sleep, and staying active unless hypertensive- activity modified as directed.    Return in about 2 weeks (around 2024) for FU OB q2wks to 36weeks.            Virginia Rasheed,   2024    [facility] [place] [facility]  [address] [place] [address]  Dept: [phone]  Dept Fax: [phone]  Loc: [phone]  Loc Fax: [phone]

## 2024-08-05 ENCOUNTER — ROUTINE PRENATAL (OUTPATIENT)
Dept: OBSTETRICS AND GYNECOLOGY | Facility: CLINIC | Age: 29
End: 2024-08-05
Payer: COMMERCIAL

## 2024-08-05 VITALS — BODY MASS INDEX: 29.53 KG/M2 | DIASTOLIC BLOOD PRESSURE: 75 MMHG | SYSTOLIC BLOOD PRESSURE: 116 MMHG | WEIGHT: 151.2 LBS

## 2024-08-05 DIAGNOSIS — Z34.80 SUPERVISION OF OTHER NORMAL PREGNANCY, ANTEPARTUM: Primary | ICD-10-CM

## 2024-08-05 LAB
GLUCOSE UR STRIP-MCNC: NEGATIVE MG/DL
PROT UR STRIP-MCNC: NEGATIVE MG/DL

## 2024-08-05 PROCEDURE — 0502F SUBSEQUENT PRENATAL CARE: CPT | Performed by: OBSTETRICS & GYNECOLOGY

## 2024-08-13 NOTE — PROGRESS NOTES
OB FOLLOW UP      Chief Complaint   Patient presents with    Routine Prenatal Visit     Subjective:   No complaints    Objective:  /66   Wt 69.9 kg (154 lb)   LMP  (LMP Unknown)   BMI 30.08 kg/m²  11.8 kg (26 lb) Body mass index is 30.08 kg/m².  See OB flow sheet for fundal height (not performed if US day of OV), FHT, edema, cvx exam if performed, and Upro/Uglu. Chaperone present during pelvic exam if performed.      Assessment and Plan:  29w6d     Diagnoses and all orders for this visit:    1. Supervision of other normal pregnancy, antepartum (Primary)  Overview:  TONYA finalized: 10/29/24 by 7-week ultrasound, LMP unknown    Optional testing NIPS,CF/SMA,AFP:  Declined all     COVID: Recommended, declines  Tdap: Recommended, s/p Rx   RSV:  Flu:    FU TPA: NEG  ? Desires Sterilization: Plans vas, declines sterilization    Anatomy US: [facility] 24 consistent w dates, cvx length 4.5cm, ant placenta, VTX, completed and wnl    PROBLEM LIST/PLAN:   Hx GHTN/pre-e - baseline labs at new OB visit nl CMP, UPC 0.11,    81mg aspirin s/p Rx- continue    Hx LEEP- with 2 subsequent 's at 39+ weeks-cervical length wnl at anatomy  Hx recurrent SAB- s/p progesterone [id number]    Orders:  -     POC Urinalysis Dipstick      Counseling:    OB precautions, leaking, VB, sofía rico vs PTL/Labor  FKC  BREECH (or NON-VERTEX) presentation discussed.  Importance of immediate evaluation on L+D if suspected SROM or labor (or any concerns) and making providers aware of possible non VTX.  WEIGHT GAIN in pregnancy reviewed.  Healthy dietary choices (increasing PRO/vegetables, decreasing CHO/fats and fast food), monitoring portion sizes, and exercise were encouraged.  Importance of monitoring diet for a healthy pregnancy and healthy fetus/infant.      Reassuring pregnancy progress. Questions answered.  Continue PNV.  Importance of healthy eating, obtaining sufficient sleep, and staying active unless hypertensive- activity modified as  directed.    Return in about 2 weeks (around 9/2/2024) for FU OB q2wks to 36weeks.            Virginia Rasheed,   08/19/2024    [facility] [place] [facility]  [address] [place] [address]  Dept: [phone]  Dept Fax: [phone]  Loc: [phone]  Loc Fax: [phone]

## 2024-08-19 ENCOUNTER — ROUTINE PRENATAL (OUTPATIENT)
Dept: OBSTETRICS AND GYNECOLOGY | Facility: CLINIC | Age: 29
End: 2024-08-19
Payer: COMMERCIAL

## 2024-08-19 VITALS — DIASTOLIC BLOOD PRESSURE: 66 MMHG | BODY MASS INDEX: 30.08 KG/M2 | WEIGHT: 154 LBS | SYSTOLIC BLOOD PRESSURE: 114 MMHG

## 2024-08-19 DIAGNOSIS — Z34.80 SUPERVISION OF OTHER NORMAL PREGNANCY, ANTEPARTUM: Primary | ICD-10-CM

## 2024-08-19 LAB
GLUCOSE UR STRIP-MCNC: NEGATIVE MG/DL
PROT UR STRIP-MCNC: NEGATIVE MG/DL

## 2024-08-19 PROCEDURE — 0502F SUBSEQUENT PRENATAL CARE: CPT | Performed by: OBSTETRICS & GYNECOLOGY

## 2024-08-21 ENCOUNTER — OFFICE VISIT (OUTPATIENT)
Dept: FAMILY MEDICINE CLINIC | Facility: CLINIC | Age: 29
End: 2024-08-21
Payer: COMMERCIAL

## 2024-08-21 VITALS
HEIGHT: 60 IN | BODY MASS INDEX: 30.19 KG/M2 | OXYGEN SATURATION: 98 % | DIASTOLIC BLOOD PRESSURE: 80 MMHG | TEMPERATURE: 98 F | SYSTOLIC BLOOD PRESSURE: 126 MMHG | WEIGHT: 153.8 LBS | HEART RATE: 71 BPM

## 2024-08-21 DIAGNOSIS — L91.8 SKIN TAG: ICD-10-CM

## 2024-08-21 DIAGNOSIS — Z3A.30 30 WEEKS GESTATION OF PREGNANCY: ICD-10-CM

## 2024-08-21 DIAGNOSIS — Z76.89 ESTABLISHING CARE WITH NEW DOCTOR, ENCOUNTER FOR: Primary | ICD-10-CM

## 2024-08-21 NOTE — PROGRESS NOTES
Marisol Ku presents to [facility] with complaints of skin tag on neck, otherwise no acute complaints and is here to establish as a new patient.      History of Present Illness  This is a 29-year-old female, no significant past medical history, who presents to clinic today with complaints of skin tag on neck and otherwise no acute complaints.    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    Patient also states that she has a skin tag on her neck that is really irritating.  States it gets caught most of her clothing, and no matter what she does she cannot seem to get it controlled and at times will get pretty irritated.  Was wondering if there is anything that can be done about this.    Will do annual physical at next visit along with annual blood work.  Patient to follow-up with OB/GYN as scheduled for pregnancy.  Do recommend Tdap vaccine which patient states that she will be getting in the next few weeks.  Refuses other vaccines/immunizations but otherwise up-to-date on other preventative screenings.    Past Medical History:   Diagnosis Date    Abnormal Pap smear of cervix     as teen, had LEEP, all wnl since    Allergic     History of gestational hypertension     HPV (human papilloma virus) infection     Preeclampsia     Urinary tract infection      Past Surgical History:    CERVICAL BIOPSY  W/ LOOP ELECTRODE EXCISION    Dr. Chakraborty    CHOLECYSTECTOMY    WISDOM TOOTH EXTRACTION       Social History     Socioeconomic History    Marital status:     Number of children: 2   Tobacco Use    Smoking status: Never    Smokeless tobacco: Never   Vaping Use    Vaping status: Never Used   Substance and Sexual Activity    Alcohol use: Not Currently    Drug  "use: Not Currently     Types: Marijuana     Comment: social-- not while pregnant    Sexual activity: Yes     Partners: Male     Birth control/protection: None       Family History   Problem Relation Age of Onset    Heart disease Father         Multiple heart stents and open heart surgery    Heart disease Paternal Grandfather         Heart Stents    Heart disease Paternal Grandmother         Heart stents    Hyperlipidemia Other     Breast cancer Neg Hx     Ovarian cancer Neg Hx     Uterine cancer Neg Hx     Colon cancer Neg Hx     Deep vein thrombosis Neg Hx     Pulmonary embolism Neg Hx          Objective   Vital Signs:   /80 (BP Location: Left arm, Patient Position: Sitting, Cuff Size: Adult)   Pulse 71   Temp 98 °F (36.7 °C)   Ht 152.4 cm (60\")   Wt 69.8 kg (153 lb 12.8 oz)   SpO2 98%   BMI 30.04 kg/m²     Body mass index is 30.04 kg/m².    All labs, imaging, test results, and specialty provider notes reviewed with patient.       Physical Exam  Vitals reviewed.   Constitutional:       Appearance: Normal appearance.   Neck:        Comments: Small skin tag noted  Cardiovascular:      Rate and Rhythm: Normal rate and regular rhythm.      Pulses: Normal pulses.      Heart sounds: Normal heart sounds.   Pulmonary:      Effort: Pulmonary effort is normal.      Breath sounds: Normal breath sounds.   Neurological:      General: No focal deficit present.      Mental Status: She is alert and oriented to person, place, and time.            Skin Tag Removal    Date/Time: 8/21/2024 5:09 PM    Performed by: Ana Fiore APRN  Authorized by: Ana Fiore APRN  Preparation: Patient was prepped and draped in the usual sterile fashion.  Local anesthesia used: no    Anesthesia:  Local anesthesia used: no    Sedation:  Patient sedated: no    Patient tolerance: patient tolerated the procedure well with no immediate complications           BMI is >= 30 and <35. (Class 1 Obesity). The following options were " offered after discussion;: exercise counseling/recommendations and nutrition counseling/recommendations            Assessment and Plan:  Diagnoses and all orders for this visit:    1. Establishing care with new doctor, encounter for (Primary)    2. Skin tag    3. 30 weeks gestation of pregnancy    Other orders  -     Skin Tag Removal          Follow Up:  Return in about 5 months (around 1/21/2025) for Annual physical.    Patient was given instructions and counseling regarding her condition or for health maintenance advice. Please see specific information pulled into the AVS if appropriate.

## 2024-08-23 ENCOUNTER — PATIENT ROUNDING (BHMG ONLY) (OUTPATIENT)
Dept: FAMILY MEDICINE CLINIC | Facility: CLINIC | Age: 29
End: 2024-08-23
Payer: COMMERCIAL

## 2024-08-28 NOTE — PROGRESS NOTES
OB FOLLOW UP      Chief Complaint   Patient presents with    Routine Prenatal Visit     Pt says she has some bruising on her butt      Subjective:   Pain w sitting and noted brusing on buttocks R>L.  No hx easy brusing, no trauma.  Occas gums bleed w brushing.  No hx hemorrhage w deliveries. Had similar pain w other preg, but not sure if had bruising    Objective:  /77   Wt 70.8 kg (156 lb)   LMP  (LMP Unknown)   BMI 30.47 kg/m²  12.7 kg (28 lb) Body mass index is 30.47 kg/m².  See OB flow sheet for fundal height (not performed if US day of OV), FHT, edema, cvx exam if performed, and Upro/Uglu. Chaperone present during pelvic exam if performed.  Buttocks right > left dime sized x4 -5 bruises noted      Assessment and Plan:  32w0d     Diagnoses and all orders for this visit:    1. Supervision of other normal pregnancy, antepartum (Primary)  Overview:  TONYA finalized: 10/29/24 by 7-week ultrasound, LMP unknown    Optional testing NIPS,CF/SMA,AFP:  Declined all     COVID: Recommended, declines  Tdap: Recommended, s/p Rx   Flu: Recommended 9/3/2024   RSV:    FU TPA: NEG  ? Desires Sterilization: Plans vas, declines sterilization    Anatomy US: [facility] 24 consistent w dates, cvx length 4.5cm, ant placenta, VTX, completed and wnl    PROBLEM LIST/PLAN:   Hx GHTN/pre-e - baseline labs at new OB visit nl CMP, UPC 0.11,    81mg aspirin s/p Rx- continue    Hx LEEP- with 2 subsequent 's at 39+ weeks-cervical length wnl at anatomy  Hx recurrent SAB- s/p progesterone [id number]    Orders:  -     POC Urinalysis Dipstick    2. Bruising  -     CBC (No Diff)      Counseling:    OB precautions, leaking, VB, sofía rico vs PTL/Labor  FKC    Reassuring pregnancy progress. Questions answered.  Continue PNV.  Importance of healthy eating, obtaining sufficient sleep, and staying active unless hypertensive- activity modified as directed.    Return in about 2 weeks (around 2024) for FU OB q2wks to 36weeks then weekly to  TONYA.            Virginia Rasheed,   09/03/2024    [facility] [place] [facility]  [address] [place] [address]  Dept: [phone]  Dept Fax: [phone]  Loc: [phone]  Loc Fax: [phone]

## 2024-09-03 ENCOUNTER — ROUTINE PRENATAL (OUTPATIENT)
Dept: OBSTETRICS AND GYNECOLOGY | Facility: CLINIC | Age: 29
End: 2024-09-03
Payer: COMMERCIAL

## 2024-09-03 VITALS — DIASTOLIC BLOOD PRESSURE: 77 MMHG | SYSTOLIC BLOOD PRESSURE: 129 MMHG | BODY MASS INDEX: 30.47 KG/M2 | WEIGHT: 156 LBS

## 2024-09-03 DIAGNOSIS — T14.8XXA BRUISING: ICD-10-CM

## 2024-09-03 DIAGNOSIS — Z34.80 SUPERVISION OF OTHER NORMAL PREGNANCY, ANTEPARTUM: Primary | ICD-10-CM

## 2024-09-03 LAB
DEPRECATED RDW RBC AUTO: 39.5 FL (ref 37–54)
ERYTHROCYTE [DISTWIDTH] IN BLOOD BY AUTOMATED COUNT: 12.6 % (ref 12.3–15.4)
GLUCOSE UR STRIP-MCNC: NEGATIVE MG/DL
HCT VFR BLD AUTO: 37.4 % (ref 34–46.6)
HGB BLD-MCNC: 12.7 G/DL (ref 12–15.9)
MCH RBC QN AUTO: 29.6 PG (ref 26.6–33)
MCHC RBC AUTO-ENTMCNC: 34 G/DL (ref 31.5–35.7)
MCV RBC AUTO: 87.2 FL (ref 79–97)
PLATELET # BLD AUTO: 242 10*3/MM3 (ref 140–450)
PMV BLD AUTO: 8.6 FL (ref 6–12)
PROT UR STRIP-MCNC: NEGATIVE MG/DL
RBC # BLD AUTO: 4.29 10*6/MM3 (ref 3.77–5.28)
WBC NRBC COR # BLD AUTO: 10.25 10*3/MM3 (ref 3.4–10.8)

## 2024-09-03 PROCEDURE — 0502F SUBSEQUENT PRENATAL CARE: CPT | Performed by: OBSTETRICS & GYNECOLOGY

## 2024-09-03 PROCEDURE — 85027 COMPLETE CBC AUTOMATED: CPT | Performed by: OBSTETRICS & GYNECOLOGY

## 2024-09-16 PROBLEM — Z98.890 HISTORY OF LOOP ELECTRICAL EXCISION PROCEDURE (LEEP): Status: RESOLVED | Noted: 2024-04-18 | Resolved: 2024-09-16

## 2024-09-17 ENCOUNTER — ROUTINE PRENATAL (OUTPATIENT)
Dept: OBSTETRICS AND GYNECOLOGY | Facility: CLINIC | Age: 29
End: 2024-09-17
Payer: COMMERCIAL

## 2024-09-17 VITALS — DIASTOLIC BLOOD PRESSURE: 73 MMHG | WEIGHT: 158 LBS | SYSTOLIC BLOOD PRESSURE: 117 MMHG | BODY MASS INDEX: 30.86 KG/M2

## 2024-09-17 DIAGNOSIS — Z23 ENCOUNTER FOR IMMUNIZATION: ICD-10-CM

## 2024-09-17 DIAGNOSIS — Z29.11 NEED FOR RSV IMMUNIZATION: ICD-10-CM

## 2024-09-17 DIAGNOSIS — Z34.80 SUPERVISION OF OTHER NORMAL PREGNANCY, ANTEPARTUM: Primary | ICD-10-CM

## 2024-09-17 LAB
GLUCOSE UR STRIP-MCNC: NEGATIVE MG/DL
PROT UR STRIP-MCNC: NEGATIVE MG/DL

## 2024-09-17 PROCEDURE — 90678 RSV VACC PREF BIVALENT IM: CPT | Performed by: OBSTETRICS & GYNECOLOGY

## 2024-09-17 PROCEDURE — 90471 IMMUNIZATION ADMIN: CPT | Performed by: OBSTETRICS & GYNECOLOGY

## 2024-09-17 PROCEDURE — 0502F SUBSEQUENT PRENATAL CARE: CPT | Performed by: OBSTETRICS & GYNECOLOGY

## 2024-09-30 NOTE — PROGRESS NOTES
OB FOLLOW UP      Chief Complaint   Patient presents with    Routine Prenatal Visit     Subjective:   Hands going numb and some swelling in hands.  Bps at home wnl.    Objective:  /82   Wt 73 kg (161 lb)   LMP  (LMP Unknown)   BMI 31.44 kg/m²  15 kg (33 lb) Body mass index is 31.44 kg/m².    See OB flow sheet for fundal height (not performed if US day of OV), FHT, edema, cvx exam if performed, and Upro/Uglu.   Chaperone present during pelvic exam if performed.  GBS RV swab performed today    Assessment and Plan:  36w1d     Diagnoses and all orders for this visit:    1. Supervision of other normal pregnancy, antepartum (Primary)  Overview:  TONYA finalized: 10/29/24 by 7-week ultrasound, LMP unknown    Optional testing NIPS,CF/SMA,AFP:  Declined all     COVID: Recommended, declines  Tdap: Vaccinated  Flu: Vaccinated   RSV: Vaccinated     FU TPA: NEG  ? Desires Sterilization: Plans vas, declines sterilization    Anatomy US: [facility] 24 consistent w dates, cvx length 4.5cm, ant placenta, VTX, completed and wnl    PROBLEM LIST/PLAN:     23Oct 39+1 AP IOL     Hx GHTN/pre-e - baseline labs at new OB visit nl CMP, UPC 0.11,    81mg aspirin s/p Rx- continue    Hx LEEP- with 2 subsequent 's at 39+ weeks-cervical length wnl at anatomy  Hx recurrent SAB- s/p progesterone [id number]    Orders:  -     Group B Strep (Molecular) - Swab, Vaginal/Rectum; Future  -     POC Urinalysis Dipstick      Counseling:    OB precautions, leaking, VB, sofía rico vs PTL/Labor  FKC    We discussed the probable diagnosis of carpal tunnel syndrome.  Typically benign in pregnancy.  Avoid foods that increase edema and swelling.  Continue to check blood pressures at home.  To be evaluated immediately for hypertension symptoms or elevated blood pressures.    Reassuring pregnancy progress. Questions answered.  Continue PNV.  Importance of healthy eating, obtaining sufficient sleep, and staying active unless hypertensive- activity  modified as directed.    Return in about 1 week (around 10/9/2024) for FU OB q1wk to TONYA/Delivery.            Virginia Rasheed, DO  10/02/2024    [facility] [place] [facility]  [address] [place] [address]  Dept: [phone]  Dept Fax: [phone]  Loc: [phone]  Loc Fax: [phone]

## 2024-10-02 ENCOUNTER — ROUTINE PRENATAL (OUTPATIENT)
Dept: OBSTETRICS AND GYNECOLOGY | Facility: CLINIC | Age: 29
End: 2024-10-02
Payer: COMMERCIAL

## 2024-10-02 VITALS — DIASTOLIC BLOOD PRESSURE: 82 MMHG | WEIGHT: 161 LBS | BODY MASS INDEX: 31.44 KG/M2 | SYSTOLIC BLOOD PRESSURE: 121 MMHG

## 2024-10-02 DIAGNOSIS — Z34.80 SUPERVISION OF OTHER NORMAL PREGNANCY, ANTEPARTUM: Primary | ICD-10-CM

## 2024-10-02 LAB
GLUCOSE UR STRIP-MCNC: NEGATIVE MG/DL
PROT UR STRIP-MCNC: NEGATIVE MG/DL

## 2024-10-02 PROCEDURE — 87653 STREP B DNA AMP PROBE: CPT | Performed by: OBSTETRICS & GYNECOLOGY

## 2024-10-03 LAB — GROUP B STREP, DNA: NEGATIVE

## 2024-10-09 ENCOUNTER — ROUTINE PRENATAL (OUTPATIENT)
Dept: OBSTETRICS AND GYNECOLOGY | Facility: CLINIC | Age: 29
End: 2024-10-09
Payer: COMMERCIAL

## 2024-10-09 VITALS — SYSTOLIC BLOOD PRESSURE: 115 MMHG | DIASTOLIC BLOOD PRESSURE: 76 MMHG | BODY MASS INDEX: 31.44 KG/M2 | WEIGHT: 161 LBS

## 2024-10-09 DIAGNOSIS — Z34.80 SUPERVISION OF OTHER NORMAL PREGNANCY, ANTEPARTUM: Primary | ICD-10-CM

## 2024-10-09 DIAGNOSIS — Z87.59 HISTORY OF GESTATIONAL HYPERTENSION: ICD-10-CM

## 2024-10-09 DIAGNOSIS — Z98.890 HISTORY OF LOOP ELECTRICAL EXCISION PROCEDURE (LEEP): ICD-10-CM

## 2024-10-09 LAB
GLUCOSE UR STRIP-MCNC: NEGATIVE MG/DL
PROT UR STRIP-MCNC: NEGATIVE MG/DL

## 2024-10-09 NOTE — PROGRESS NOTES
Routine Prenatal Visit     Subjective  Marisol Ku is a 29 y.o.  at 37w1d here for her routine OB visit.   She is taking her prenatal vitamins.Reports no loss of fluid or vaginal bleeding. Patient doing well without any complaints. Pregnancy complicated by:     Patient Active Problem List   Diagnosis    History of recurrent miscarriages    Supervision of other normal pregnancy, antepartum    History of gestational hypertension         OB History    Para Term  AB Living   5 2 2 0 2 2   SAB IAB Ectopic Molar Multiple Live Births   2 0 0 0 0 2      # Outcome Date GA Lbr Ta/2nd Weight Sex Type Anes PTL Lv   5 Current            4 SAB 2023 5w0d          3 SAB 2023 5w0d          2 Term 09/15/21 39w0d / 00:21 3190 g (7 lb 0.5 oz) F Vag-Spont EPI N VALERIE   1 Term 19 39w0d  3487 g (7 lb 11 oz) M Vag-Spont  N VALERIE           ROS:   General ROS: negative for - chills or fatigue  Respiratory ROS: negative for - cough or hemoptysis  Cardiovascular ROS: negative for - chest pain or dyspnea on exertion  Genito-Urinary ROS: negative for  change in urinary stream, vaginal discharge   Musculoskeletal ROS: negative for - gait disturbance or joint pain  Dermatological ROS: negative for acne,  dry skin or itching    Objective  Physical Exam:   Vitals:    10/09/24 1025   BP: 115/76       Uterine Size: size greater than dates  FHT: 110-160 BPM    General appearance - alert, well appearing, and in no distress  Mental status - alert, oriented to person, place, and time  Abdomen- Soft, Gravid uterus, non-tender to palpation  Musculoskeletal: negative for - gait disturbance or joint pain  Extremeties: negative swelling or cyanosis   Dermatological: negative rashes or skin lesions   2/60%/-3/ANTERIOR/SOFT    Assessment/Plan:   Diagnoses and all orders for this visit:    1. Supervision of other normal pregnancy, antepartum (Primary)  -     POC Urinalysis Dipstick    2. History of gestational  hypertension    3. History of loop electrical excision procedure (LEEP)            Counseling:   OB precautions, leaking, VB, sofía rico vs PTL/Labor  FKC  HTN precautions reviewed: HA, vision change, RUQ/epigastric pain, edema  Round Ligament Pain:  The uterus has several ligaments which provide support and keep the uterus in place. As the  uterus grows these ligaments are pulled and stretched which often causes sharp stabbing like pain in the inguinal area.   You may find a pregnancy support band helpful. Changing positions may also help. Yoga is a great way to cope with round ligament and low back pain in pregnancy.    Massage may also help with low back pain   Things to Consider at this Point in your Pregnancy:  Some women experience swelling in their feet during pregnancy. Compression stockings may help  Drink plenty of water and stay active   Make sure you are eating frequent small meals, nuts are a wonderful snack to keep with you            Return in about 1 week (around 10/16/2024) for Routine OB visit.      We have gone over prenatal care to include the timing and content of visits. I informed her how to contact the office and/or on call person in the event of any problems and encouraged her to do so when she feels it is necessary.  We then spent time answering her questions which she indicated were answered to her satisfaction.    Suzanna Moreno DO  10/9/2024 10:41 EDT

## 2024-10-11 ENCOUNTER — TELEPHONE (OUTPATIENT)
Dept: OBSTETRICS AND GYNECOLOGY | Facility: CLINIC | Age: 29
End: 2024-10-11
Payer: COMMERCIAL

## 2024-10-11 NOTE — TELEPHONE ENCOUNTER
Caller: Marisol Ku     Relationship: [unfilled]     Best call back number: [phone] (home)       What is your medical concern? PT STATES SHE MONITORS HER BLOOD PRESSURE DUE TO HAVING PRE ECLAMPSIA WITH HER FIRST PREGNANCY. SHE STATES SHE HAS FELT ''OFF'' AND WEAK/LIGHT HEADED. SHE CHECKED HER BP AND IT /40-JO. SHE IS CONCERNED WITH THE BOTTOM NUMBER BEING LOWER THAN NORMAL. PLEASE CALL PT BACK TO DISCUSS FURTHER.    How long has this issue been going on? TODAY    Is your provider already aware of this issue? NO    Have you been treated for this issue? NO

## 2024-10-11 NOTE — TELEPHONE ENCOUNTER
Patient states she spoke to a family member that is a nurse and she recommended drinking pedialyte or a sports drink. Patient did try that and her BP did increase and she feels a bit better. Patient advised to monitor and if gets low go to L&D for evaluation.

## 2024-10-15 ENCOUNTER — TELEPHONE (OUTPATIENT)
Dept: OBSTETRICS AND GYNECOLOGY | Facility: CLINIC | Age: 29
End: 2024-10-15

## 2024-10-15 ENCOUNTER — LAB (OUTPATIENT)
Dept: LAB | Facility: HOSPITAL | Age: 29
End: 2024-10-15
Payer: COMMERCIAL

## 2024-10-15 DIAGNOSIS — L29.9 ITCHING: Primary | ICD-10-CM

## 2024-10-15 LAB
ALBUMIN SERPL-MCNC: 3.7 G/DL (ref 3.5–5.2)
ALBUMIN/GLOB SERPL: 1.2 G/DL
ALP SERPL-CCNC: 107 U/L (ref 39–117)
ALT SERPL W P-5'-P-CCNC: 13 U/L (ref 1–33)
ANION GAP SERPL CALCULATED.3IONS-SCNC: 14.5 MMOL/L (ref 5–15)
AST SERPL-CCNC: 17 U/L (ref 1–32)
BILE AC SERPL-SCNC: 6 UMOL/L (ref 0–10)
BILIRUB SERPL-MCNC: 0.3 MG/DL (ref 0–1.2)
BUN SERPL-MCNC: 9 MG/DL (ref 6–20)
BUN/CREAT SERPL: 11.8 (ref 7–25)
CALCIUM SPEC-SCNC: 9.6 MG/DL (ref 8.6–10.5)
CHLORIDE SERPL-SCNC: 102 MMOL/L (ref 98–107)
CO2 SERPL-SCNC: 18.5 MMOL/L (ref 22–29)
CREAT SERPL-MCNC: 0.76 MG/DL (ref 0.57–1)
EGFRCR SERPLBLD CKD-EPI 2021: 108.3 ML/MIN/1.73
GLOBULIN UR ELPH-MCNC: 3.1 GM/DL
GLUCOSE SERPL-MCNC: 105 MG/DL (ref 65–99)
POTASSIUM SERPL-SCNC: 3.6 MMOL/L (ref 3.5–5.2)
PROT SERPL-MCNC: 6.8 G/DL (ref 6–8.5)
SODIUM SERPL-SCNC: 135 MMOL/L (ref 136–145)

## 2024-10-15 PROCEDURE — 36415 COLL VENOUS BLD VENIPUNCTURE: CPT | Performed by: OBSTETRICS & GYNECOLOGY

## 2024-10-15 PROCEDURE — 82239 BILE ACIDS TOTAL: CPT | Performed by: OBSTETRICS & GYNECOLOGY

## 2024-10-15 PROCEDURE — 80053 COMPREHEN METABOLIC PANEL: CPT | Performed by: OBSTETRICS & GYNECOLOGY

## 2024-10-15 NOTE — TELEPHONE ENCOUNTER
Caller: Marisol Ku      Relationship: SELF     Best call back number: [phone] / LVM     What is your medical concern? PT STATES THAT THE PALMS OF HER HANDS ARE ITCHING LIKE CRAZY AND HER HANDS ARE TINGLING - PT GOOGLED SYMPTOMS AND IT SHOWS THAT IT CAN BE CAUSED BY HAVING LIVER ISSUES - PT WANTS TO KNOW IF SHE SHOULD BE WORRIED - PT IS BEING INDUCED ON 10/23/24     How long has this issue been going on? TINGLING A FEW WEEKS - THE ITCHING THE LAST 2 DAYS      Is your provider already aware of this issue? NO     Have you been treated for this issue? NO       PLEASE CALL THE PT TO DISCUSS    THANK YOU!

## 2024-10-16 ENCOUNTER — TELEPHONE (OUTPATIENT)
Dept: OBSTETRICS AND GYNECOLOGY | Facility: CLINIC | Age: 29
End: 2024-10-16
Payer: COMMERCIAL

## 2024-10-16 NOTE — TELEPHONE ENCOUNTER
Caller: Marisol Ku    Relationship: Self    Best call back number: [phone]    Caller requesting test results: LABS    What test was performed: CMP, BILE ACIDS    When was the test performed: 10/15/24    Where was the test performed: [facility]    Additional notes: ANY TIME OK TO CALL BACK, OK TO M           Statement Selected

## 2024-10-17 NOTE — PROGRESS NOTES
OB FOLLOW UP      Chief Complaint   Patient presents with    Routine Prenatal Visit     Subjective:   Finger tips get numb using wrist braces  Still itching, but much less.  Nl bile acids 2 days ago  Checking BP at home, all wnl or low    Objective:  /81   Wt 73.8 kg (162 lb 12.8 oz)   LMP  (LMP Unknown)   BMI 31.79 kg/m²  15.8 kg (34 lb 12.8 oz) Body mass index is 31.79 kg/m².    See OB flow sheet for fundal height (not performed if US day of OV), FHT, edema, cvx exam if performed, and Upro/Uglu.   Chaperone present during pelvic exam if performed.      Assessment and Plan:  38w3d     Diagnoses and all orders for this visit:    1. Supervision of other normal pregnancy, antepartum (Primary)  Overview:  TONYA finalized: 10/29/24 by 7-week ultrasound, LMP unknown    Optional testing NIPS,CF/SMA,AFP:  Declined all     COVID: Recommended, declines  Tdap: Vaccinated  Flu: Vaccinated   RSV: Vaccinated     FU TPA: NEG  ? Desires Sterilization: Plans vas, declines sterilization    Anatomy US: [facility] 24 consistent w dates, cvx length 4.5cm, ant placenta, VTX, completed and wnl    PROBLEM LIST/PLAN:     23Oct 39+1 AP IOL     Hx GHTN/pre-e - baseline labs at new OB visit nl CMP, UPC 0.11,    81mg aspirin s/p Rx- continue    Hx LEEP- with 2 subsequent 's at 39+ weeks-cervical length wnl at anatomy  Hx recurrent SAB- s/p progesterone [id number]    Orders:  -     POC Urinalysis Dipstick      Counseling:    OB precautions, leaking, VB, sofía rico vs PTL/Labor  FKC  HTN precautions reviewed: HA, vision change, RUQ/epigastric pain, edema  CARPEL TUNNEL SYNDROME (CTS) in pregnancy discussed.  It is common in pregnancy, especially third trimester.  Physiologic changes in pregnancy reviewed.  Cont OTC wrist splints at bedtime and/or continuously during the day are recommended for at least 1-2 month trial.      Reassuring pregnancy progress. Questions answered.  Continue PNV.  Importance of healthy eating,  obtaining sufficient sleep, and staying active unless hypertensive- activity modified as directed.    Return in about 1 week (around 10/25/2024) for FU OB.            Virginia Rasheed,   10/18/2024    [facility] [place] [facility]  [address] [place] [address]  Dept: [phone]  Dept Fax: [phone]  Loc: [phone]  Loc Fax: [phone]

## 2024-10-18 ENCOUNTER — ROUTINE PRENATAL (OUTPATIENT)
Dept: OBSTETRICS AND GYNECOLOGY | Facility: CLINIC | Age: 29
End: 2024-10-18
Payer: COMMERCIAL

## 2024-10-18 VITALS — DIASTOLIC BLOOD PRESSURE: 81 MMHG | BODY MASS INDEX: 31.79 KG/M2 | WEIGHT: 162.8 LBS | SYSTOLIC BLOOD PRESSURE: 122 MMHG

## 2024-10-18 DIAGNOSIS — Z34.80 SUPERVISION OF OTHER NORMAL PREGNANCY, ANTEPARTUM: Primary | ICD-10-CM

## 2024-10-18 LAB
GLUCOSE UR STRIP-MCNC: NEGATIVE MG/DL
PROT UR STRIP-MCNC: NEGATIVE MG/DL

## 2024-10-21 NOTE — PROGRESS NOTES
OB FOLLOW UP      Chief Complaint   Patient presents with    Routine Prenatal Visit     Subjective:   No complaints    Objective:  /68   Wt 73.9 kg (163 lb)   LMP  (LMP Unknown)   BMI 31.83 kg/m²  15.9 kg (35 lb) Body mass index is 31.83 kg/m².    See OB flow sheet for fundal height (not performed if US day of OV), FHT, edema, cvx exam if performed, and Upro/Uglu.   Chaperone present during pelvic exam if performed.      Assessment and Plan:  39w0d     Diagnoses and all orders for this visit:    1. Supervision of other normal pregnancy, antepartum (Primary)  Overview:  TONYA finalized: 10/29/24 by 7-week ultrasound, LMP unknown    Optional testing NIPS,CF/SMA,AFP:  Declined all     COVID: Recommended, declines  Tdap: Vaccinated  Flu: Vaccinated   RSV: Vaccinated     FU TPA: NEG  ? Desires Sterilization: Plans vas, declines sterilization    Anatomy US: [facility] 24 consistent w dates, cvx length 4.5cm, ant placenta, VTX, completed and wnl    PROBLEM LIST/PLAN:     23Oct 39+1 AP IOL     Hx GHTN/pre-e - baseline labs at new OB visit nl CMP, UPC 0.11,    81mg aspirin s/p Rx- stop    Hx LEEP- with 2 subsequent 's at 39+ weeks-cervical length wnl at anatomy  Hx recurrent SAB- s/p progesterone [id number]    Orders:  -     POC Urinalysis Dipstick      Counseling:    OB precautions, leaking, VB, sofía rico vs PTL/Labor  FKC  IOL reviewed in detail.  R/B/A/SE/E.  All history reviewed and updated.  Pre-IOL exam performed.  Length can be 24-48+hrs.  PLAN: Pitocin and AROM.  All questions answered.  She desires to proceed as planned.  She understands during early am the OB Hospitalist physicians will manage her labor and deliver prn any emergencies.      Reassuring pregnancy progress. Questions answered.  Continue PNV.  Importance of healthy eating, obtaining sufficient sleep, and staying active unless hypertensive- activity modified as directed.    Return in about 4 weeks (around 2024) for Postpartum  DIPIKA.            Virginia Rasheed,   10/22/2024    [facility] [place] [facility]  [address] [place] [address]  Dept: [phone]  Dept Fax: [phone]  Loc: [phone]  Loc Fax: [phone]

## 2024-10-22 ENCOUNTER — ROUTINE PRENATAL (OUTPATIENT)
Dept: OBSTETRICS AND GYNECOLOGY | Facility: CLINIC | Age: 29
End: 2024-10-22
Payer: COMMERCIAL

## 2024-10-22 ENCOUNTER — PREP FOR SURGERY (OUTPATIENT)
Dept: OTHER | Facility: HOSPITAL | Age: 29
End: 2024-10-22
Payer: COMMERCIAL

## 2024-10-22 VITALS — BODY MASS INDEX: 31.83 KG/M2 | DIASTOLIC BLOOD PRESSURE: 68 MMHG | SYSTOLIC BLOOD PRESSURE: 110 MMHG | WEIGHT: 163 LBS

## 2024-10-22 DIAGNOSIS — Z34.80 SUPERVISION OF OTHER NORMAL PREGNANCY, ANTEPARTUM: Primary | ICD-10-CM

## 2024-10-22 LAB
GLUCOSE UR STRIP-MCNC: NEGATIVE MG/DL
PROT UR STRIP-MCNC: NEGATIVE MG/DL

## 2024-10-22 RX ORDER — SODIUM CHLORIDE 0.9 % (FLUSH) 0.9 %
10 SYRINGE (ML) INJECTION AS NEEDED
Status: CANCELLED | OUTPATIENT
Start: 2024-10-22

## 2024-10-22 RX ORDER — PROMETHAZINE HYDROCHLORIDE 25 MG/1
25 TABLET ORAL EVERY 6 HOURS PRN
Status: CANCELLED | OUTPATIENT
Start: 2024-10-22

## 2024-10-22 RX ORDER — HYDROCODONE BITARTRATE AND ACETAMINOPHEN 5; 325 MG/1; MG/1
1 TABLET ORAL EVERY 4 HOURS PRN
Status: CANCELLED | OUTPATIENT
Start: 2024-10-22 | End: 2024-10-29

## 2024-10-22 RX ORDER — FAMOTIDINE 20 MG/1
20 TABLET, FILM COATED ORAL ONCE AS NEEDED
Status: CANCELLED | OUTPATIENT
Start: 2024-10-22

## 2024-10-22 RX ORDER — TERBUTALINE SULFATE 1 MG/ML
0.25 INJECTION, SOLUTION SUBCUTANEOUS AS NEEDED
Status: CANCELLED | OUTPATIENT
Start: 2024-10-22

## 2024-10-22 RX ORDER — LIDOCAINE HYDROCHLORIDE 10 MG/ML
0.5 INJECTION, SOLUTION EPIDURAL; INFILTRATION; INTRACAUDAL; PERINEURAL ONCE AS NEEDED
Status: CANCELLED | OUTPATIENT
Start: 2024-10-22

## 2024-10-22 RX ORDER — OXYTOCIN/0.9 % SODIUM CHLORIDE 30/500 ML
1-16 PLASTIC BAG, INJECTION (ML) INTRAVENOUS
Status: CANCELLED | OUTPATIENT
Start: 2024-10-22

## 2024-10-22 RX ORDER — SODIUM CHLORIDE 9 MG/ML
40 INJECTION, SOLUTION INTRAVENOUS AS NEEDED
Status: CANCELLED | OUTPATIENT
Start: 2024-10-22 | End: 2024-10-24

## 2024-10-22 RX ORDER — PROMETHAZINE HYDROCHLORIDE 12.5 MG/1
12.5 TABLET ORAL EVERY 6 HOURS PRN
Status: CANCELLED | OUTPATIENT
Start: 2024-10-22

## 2024-10-22 RX ORDER — METOCLOPRAMIDE HYDROCHLORIDE 5 MG/ML
10 INJECTION INTRAMUSCULAR; INTRAVENOUS EVERY 6 HOURS PRN
Status: CANCELLED | OUTPATIENT
Start: 2024-10-22

## 2024-10-22 RX ORDER — IBUPROFEN 800 MG/1
800 TABLET, FILM COATED ORAL ONCE AS NEEDED
Status: CANCELLED | OUTPATIENT
Start: 2024-10-22

## 2024-10-22 RX ORDER — ONDANSETRON 4 MG/1
4 TABLET, ORALLY DISINTEGRATING ORAL EVERY 6 HOURS PRN
Status: CANCELLED | OUTPATIENT
Start: 2024-10-22

## 2024-10-22 RX ORDER — ACETAMINOPHEN 325 MG/1
650 TABLET ORAL ONCE AS NEEDED
Status: CANCELLED | OUTPATIENT
Start: 2024-10-22

## 2024-10-22 RX ORDER — METHYLERGONOVINE MALEATE 0.2 MG/ML
200 INJECTION INTRAVENOUS ONCE AS NEEDED
Status: CANCELLED | OUTPATIENT
Start: 2024-10-22

## 2024-10-22 RX ORDER — FAMOTIDINE 10 MG/ML
20 INJECTION, SOLUTION INTRAVENOUS 2 TIMES DAILY PRN
Status: CANCELLED | OUTPATIENT
Start: 2024-10-22

## 2024-10-22 RX ORDER — SODIUM CHLORIDE 0.9 % (FLUSH) 0.9 %
10 SYRINGE (ML) INJECTION EVERY 12 HOURS SCHEDULED
Status: CANCELLED | OUTPATIENT
Start: 2024-10-22

## 2024-10-22 RX ORDER — OXYTOCIN/0.9 % SODIUM CHLORIDE 30/500 ML
250 PLASTIC BAG, INJECTION (ML) INTRAVENOUS CONTINUOUS
Status: CANCELLED | OUTPATIENT
Start: 2024-10-22 | End: 2024-10-22

## 2024-10-22 RX ORDER — SODIUM CHLORIDE, SODIUM LACTATE, POTASSIUM CHLORIDE, CALCIUM CHLORIDE 600; 310; 30; 20 MG/100ML; MG/100ML; MG/100ML; MG/100ML
125 INJECTION, SOLUTION INTRAVENOUS CONTINUOUS
Status: CANCELLED | OUTPATIENT
Start: 2024-10-22 | End: 2024-10-24

## 2024-10-22 RX ORDER — MISOPROSTOL 200 UG/1
800 TABLET ORAL AS NEEDED
Status: CANCELLED | OUTPATIENT
Start: 2024-10-22

## 2024-10-22 RX ORDER — ONDANSETRON 2 MG/ML
4 INJECTION INTRAMUSCULAR; INTRAVENOUS EVERY 6 HOURS PRN
Status: CANCELLED | OUTPATIENT
Start: 2024-10-22

## 2024-10-22 RX ORDER — HYDROCODONE BITARTRATE AND ACETAMINOPHEN 10; 325 MG/1; MG/1
1 TABLET ORAL EVERY 4 HOURS PRN
Status: CANCELLED | OUTPATIENT
Start: 2024-10-22 | End: 2024-10-29

## 2024-10-22 RX ORDER — FAMOTIDINE 20 MG/1
20 TABLET, FILM COATED ORAL 2 TIMES DAILY PRN
Status: CANCELLED | OUTPATIENT
Start: 2024-10-22

## 2024-10-22 RX ORDER — MAGNESIUM CARB/ALUMINUM HYDROX 105-160MG
30 TABLET,CHEWABLE ORAL ONCE
Status: CANCELLED | OUTPATIENT
Start: 2024-10-22 | End: 2024-10-22

## 2024-10-22 RX ORDER — FAMOTIDINE 10 MG/ML
20 INJECTION, SOLUTION INTRAVENOUS ONCE AS NEEDED
Status: CANCELLED | OUTPATIENT
Start: 2024-10-22

## 2024-10-22 RX ORDER — ACETAMINOPHEN 325 MG/1
650 TABLET ORAL EVERY 4 HOURS PRN
Status: CANCELLED | OUTPATIENT
Start: 2024-10-22

## 2024-10-22 RX ORDER — OXYTOCIN/0.9 % SODIUM CHLORIDE 30/500 ML
999 PLASTIC BAG, INJECTION (ML) INTRAVENOUS ONCE
Status: CANCELLED | OUTPATIENT
Start: 2024-10-22 | End: 2024-10-22

## 2024-10-22 NOTE — H&P (VIEW-ONLY)
OB HISTORY AND PHYSICAL      SUBJECTIVE:    29 y.o. 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     ROS: No leaking fluid, No vaginal bleeding, No contractions, and Is feeling adequate FM    Past OB History:   OB History    Para Term  AB Living   5 2 2 0 2 2   SAB IAB Ectopic Molar Multiple Live Births   2 0 0 0 0 2      # Outcome Date GA Lbr Ta/2nd Weight Sex Type Anes PTL Lv   5 Current            4 SAB 2023 5w0d          3 SAB 2023 5w0d          2 Term 09/15/21 39w0d / 00:21 3190 g (7 lb 0.5 oz) F Vag-Spont EPI N VALERIE   1 Term 19 39w0d  3487 g (7 lb 11 oz) M Vag-Spont  N VALERIE        Prenatal Labs:    Prenatal Results       Initial Prenatal Labs       Test Value Reference Range Date Time    Hemoglobin  12.3 g/dL 12.0 - 15.9 24 1316    Hematocrit  36.1 % 34.0 - 46.6 24 1316    Platelets  314 10*3/mm3 140 - 450 24 1316    Rubella IgG  1.15 index Immune >0.99 24 1044    Hepatitis B SAg  Non-Reactive  Non-Reactive 24 1044    Hepatitis C Ab  Non-Reactive  Non-Reactive 24 1044    RPR        T. Pallidum Ab   Non-Reactive  Non-Reactive 07/15/24 1133       Non-Reactive  Non-Reactive 24 1044    ABO  A   24 1044    Rh  Positive   24 1044    Antibody Screen  Negative   24 1044    HIV  Non-Reactive  Non-Reactive 24 1044    Urine Culture  No growth   24 1013    Gonorrhea  Not Detected  Not Detected  24 1015    Chlamydia  Not Detected  Not Detected  24 1015    TSH        HgB A1c         Varicella IgG        Hemoglobinopathy Fractionation        Hemoglobinopathy (genetic testing)        Cystic fibrosis                   Fetal testing        Test Value Reference Range  Date Time    NIPT        MSAFP        AFP-4                  2nd and 3rd Trimester       Test Value Reference Range Date Time    Hemoglobin (repeated)  12.7 g/dL 12.0 - 15.9 09/03/24 1232       12.1 g/dL 12.0 - 15.9 07/15/24 1133    Hematocrit (repeated)  37.4 % 34.0 - 46.6 09/03/24 1232       35.8 % 34.0 - 46.6 07/15/24 1133    Platelets   242 10*3/mm3 140 - 450 09/03/24 1232       281 10*3/mm3 140 - 450 07/15/24 1133       314 10*3/mm3 140 - 450 05/14/24 1316    1 hour GTT   69 mg/dL 65 - 139 07/15/24 1133    Antibody Screen (repeated)        3rd TM syphilis scrn (repeated)  RPR         3rd TM syphilis scrn (repeated) TP-Ab        3rd TM syphilis screen TB-Ab (FTA)        Syphilis cascade test TP-Ab (EIA)        Syphilis cascade TPPA        GTT Fasting        GTT 1 Hr        GTT 2 Hr        GTT 3 Hr        Group B Strep  Negative  Negative 10/02/24 1058              Other testing        Test Value Reference Range Date Time    Parvo IgG         CMV IgG                   Drug Screening       Test Value Reference Range Date Time    Amphetamine Screen        Barbiturate Screen  Negative  Negative 04/19/24 1013    Benzodiazepine Screen  Negative  Negative 04/19/24 1013    Methadone Screen  Negative  Negative 04/19/24 1013    Phencyclidine Screen        Opiates Screen  Negative  Negative 04/19/24 1013    THC Screen  Negative  Negative 04/19/24 1013    Cocaine Screen  Negative  Negative 04/19/24 1013    Propoxyphene Screen        Buprenorphine Screen        Methamphetamine Screen        Oxycodone Screen  Negative  Negative 04/19/24 1013    Tricyclic Antidepressants Screen                     PMHx:    Past Medical History:   Diagnosis Date    Abnormal Pap smear of cervix     as teen, had LEEP, all wnl since    Allergic     History of gestational hypertension     History of loop electrical excision procedure (LEEP) 04/18/2024    HPV (human papilloma virus) infection     Preeclampsia     Urinary tract infection         Medications:  EPINEPHrine, famotidine, and prenatal vitamin    Allergies:  No Known Allergies    PSHx:    Past Surgical History:   Procedure Laterality Date    CERVICAL BIOPSY  W/ LOOP ELECTRODE EXCISION      Dr. Chakraborty    CHOLECYSTECTOMY      WISDOM TOOTH EXTRACTION         Social History:    reports that she has never smoked. She has never used smokeless tobacco. She reports that she does not currently use alcohol. She reports that she does not currently use drugs after having used the following drugs: Marijuana.    Family History: Non contributory    Immunizations: See prenatal record for Tdap, Flu, Covid and/or other vaccinations    PHYSICAL EXAM:  BP: (110)/(68) 110/68  General- NAD, alert and oriented, appropriate  Psych- normal mood, good memory  Cardiovascular- Regular rhythm, no murnurs  Respiratory- CTA to bases, no wheezes  Abdomen- Gravid, non tender  Fundus-  Non tender.  Size: consistent with dates  EFW- 7 1/2 lbs, 8 lbs   Pelvis-  Adequate   Cervix- 3-4cm / 70% / -3  Presentation- VTX  Extremities/DTRs- Trace edema, bilaterally equal, no signs of DVT    Fetal HR: Doptones 110-160, see last OV note  Contractions: Not complaining of any regular contractions        ASSESSMENT:  39w0d  Scheduled IOL    Lab Results   Component Value Date    STREPGPB Negative 10/02/2024       PLAN:  Admit  Delivery: IOL, pitocin and arom, OBGYN Hospitalist to admit at pts arrival and manage/deliver prn any emergencies until 0700.  Pt instructed to arrive at SB #2    Plan of care [facility] hospital course, R/B/A/potential SE, suspected length <24hrs have been reviewed with patient and any family or friends present, questions answered to her/his/their satisfaction.  Pt desires to proceed as above.    Counseling:The patient was counseled on the risks, benefits and alternatives of Induction.  Risks reviewed, but are not limited to: bleeding, transfusion, fetal intolerance,  (possibly emergent),  and uterine  rupture.  She declines expectant management or  and desires induction.  All her questions have been answered to her satisfaction and she desires to proceed.          Electronically signed by Virginia Rasheed DO, 10/22/24, 4:13 PM EDT.    [facility]  [address] [place] [address]  Dept: [phone]  Loc: [phone]

## 2024-10-22 NOTE — H&P
OB HISTORY AND PHYSICAL      SUBJECTIVE:    29 y.o. 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     ROS: No leaking fluid, No vaginal bleeding, No contractions, and Is feeling adequate FM    Past OB History:   OB History    Para Term  AB Living   5 2 2 0 2 2   SAB IAB Ectopic Molar Multiple Live Births   2 0 0 0 0 2      # Outcome Date GA Lbr Ta/2nd Weight Sex Type Anes PTL Lv   5 Current            4 SAB 2023 5w0d          3 SAB 2023 5w0d          2 Term 09/15/21 39w0d / 00:21 3190 g (7 lb 0.5 oz) F Vag-Spont EPI N VALERIE   1 Term 19 39w0d  3487 g (7 lb 11 oz) M Vag-Spont  N VALERIE        Prenatal Labs:    Prenatal Results       Initial Prenatal Labs       Test Value Reference Range Date Time    Hemoglobin  12.3 g/dL 12.0 - 15.9 24 1316    Hematocrit  36.1 % 34.0 - 46.6 24 1316    Platelets  314 10*3/mm3 140 - 450 24 1316    Rubella IgG  1.15 index Immune >0.99 24 1044    Hepatitis B SAg  Non-Reactive  Non-Reactive 24 1044    Hepatitis C Ab  Non-Reactive  Non-Reactive 24 1044    RPR        T. Pallidum Ab   Non-Reactive  Non-Reactive 07/15/24 1133       Non-Reactive  Non-Reactive 24 1044    ABO  A   24 1044    Rh  Positive   24 1044    Antibody Screen  Negative   24 1044    HIV  Non-Reactive  Non-Reactive 24 1044    Urine Culture  No growth   24 1013    Gonorrhea  Not Detected  Not Detected  24 1015    Chlamydia  Not Detected  Not Detected  24 1015    TSH        HgB A1c         Varicella IgG        Hemoglobinopathy Fractionation        Hemoglobinopathy (genetic testing)        Cystic fibrosis                   Fetal testing        Test Value Reference Range  Date Time    NIPT        MSAFP        AFP-4                  2nd and 3rd Trimester       Test Value Reference Range Date Time    Hemoglobin (repeated)  12.7 g/dL 12.0 - 15.9 09/03/24 1232       12.1 g/dL 12.0 - 15.9 07/15/24 1133    Hematocrit (repeated)  37.4 % 34.0 - 46.6 09/03/24 1232       35.8 % 34.0 - 46.6 07/15/24 1133    Platelets   242 10*3/mm3 140 - 450 09/03/24 1232       281 10*3/mm3 140 - 450 07/15/24 1133       314 10*3/mm3 140 - 450 05/14/24 1316    1 hour GTT   69 mg/dL 65 - 139 07/15/24 1133    Antibody Screen (repeated)        3rd TM syphilis scrn (repeated)  RPR         3rd TM syphilis scrn (repeated) TP-Ab        3rd TM syphilis screen TB-Ab (FTA)        Syphilis cascade test TP-Ab (EIA)        Syphilis cascade TPPA        GTT Fasting        GTT 1 Hr        GTT 2 Hr        GTT 3 Hr        Group B Strep  Negative  Negative 10/02/24 1058              Other testing        Test Value Reference Range Date Time    Parvo IgG         CMV IgG                   Drug Screening       Test Value Reference Range Date Time    Amphetamine Screen        Barbiturate Screen  Negative  Negative 04/19/24 1013    Benzodiazepine Screen  Negative  Negative 04/19/24 1013    Methadone Screen  Negative  Negative 04/19/24 1013    Phencyclidine Screen        Opiates Screen  Negative  Negative 04/19/24 1013    THC Screen  Negative  Negative 04/19/24 1013    Cocaine Screen  Negative  Negative 04/19/24 1013    Propoxyphene Screen        Buprenorphine Screen        Methamphetamine Screen        Oxycodone Screen  Negative  Negative 04/19/24 1013    Tricyclic Antidepressants Screen                     PMHx:    Past Medical History:   Diagnosis Date    Abnormal Pap smear of cervix     as teen, had LEEP, all wnl since    Allergic     History of gestational hypertension     History of loop electrical excision procedure (LEEP) 04/18/2024    HPV (human papilloma virus) infection     Preeclampsia     Urinary tract infection         Medications:  EPINEPHrine, famotidine, and prenatal vitamin    Allergies:  No Known Allergies    PSHx:    Past Surgical History:   Procedure Laterality Date    CERVICAL BIOPSY  W/ LOOP ELECTRODE EXCISION      Dr. Chakraborty    CHOLECYSTECTOMY      WISDOM TOOTH EXTRACTION         Social History:    reports that she has never smoked. She has never used smokeless tobacco. She reports that she does not currently use alcohol. She reports that she does not currently use drugs after having used the following drugs: Marijuana.    Family History: Non contributory    Immunizations: See prenatal record for Tdap, Flu, Covid and/or other vaccinations    PHYSICAL EXAM:  BP: (110)/(68) 110/68  General- NAD, alert and oriented, appropriate  Psych- normal mood, good memory  Cardiovascular- Regular rhythm, no murnurs  Respiratory- CTA to bases, no wheezes  Abdomen- Gravid, non tender  Fundus-  Non tender.  Size: consistent with dates  EFW- 7 1/2 lbs, 8 lbs   Pelvis-  Adequate   Cervix- 3-4cm / 70% / -3  Presentation- VTX  Extremities/DTRs- Trace edema, bilaterally equal, no signs of DVT    Fetal HR: Doptones 110-160, see last OV note  Contractions: Not complaining of any regular contractions        ASSESSMENT:  39w0d  Scheduled IOL    Lab Results   Component Value Date    STREPGPB Negative 10/02/2024       PLAN:  Admit  Delivery: IOL, pitocin and arom, OBGYN Hospitalist to admit at pts arrival and manage/deliver prn any emergencies until 0700.  Pt instructed to arrive at SB #2    Plan of care [facility] hospital course, R/B/A/potential SE, suspected length <24hrs have been reviewed with patient and any family or friends present, questions answered to her/his/their satisfaction.  Pt desires to proceed as above.    Counseling:The patient was counseled on the risks, benefits and alternatives of Induction.  Risks reviewed, but are not limited to: bleeding, transfusion, fetal intolerance,  (possibly emergent),  and uterine  rupture.  She declines expectant management or  and desires induction.  All her questions have been answered to her satisfaction and she desires to proceed.          Electronically signed by Virginia Rasheed DO, 10/22/24, 4:13 PM EDT.    [facility]  [address] [facility] [place] [address]  Dept: [phone]  Loc: [phone]

## 2024-10-23 ENCOUNTER — HOSPITAL ENCOUNTER (OUTPATIENT)
Dept: LABOR AND DELIVERY | Facility: HOSPITAL | Age: 29
Discharge: HOME | End: 2024-10-23
Payer: COMMERCIAL

## 2024-10-23 ENCOUNTER — ANESTHESIA EVENT (OUTPATIENT)
Dept: LABOR AND DELIVERY | Facility: HOSPITAL | Age: 29
End: 2024-10-23
Payer: COMMERCIAL

## 2024-10-23 ENCOUNTER — ANESTHESIA (OUTPATIENT)
Dept: LABOR AND DELIVERY | Facility: HOSPITAL | Age: 29
End: 2024-10-23
Payer: COMMERCIAL

## 2024-10-23 ENCOUNTER — HOSPITAL ENCOUNTER (INPATIENT)
Facility: HOSPITAL | Age: 29
LOS: 1 days | Discharge: HOME | End: 2024-10-24
Attending: OBSTETRICS & GYNECOLOGY | Admitting: OBSTETRICS & GYNECOLOGY
Payer: COMMERCIAL

## 2024-10-23 PROBLEM — Z34.90 ENCOUNTER FOR INDUCTION OF LABOR: Status: ACTIVE | Noted: 2024-10-23

## 2024-10-23 LAB
ABO GROUP BLD: NORMAL
AMPHET+METHAMPHET UR QL: NEGATIVE
AMPHETAMINES UR QL: NEGATIVE
ATMOSPHERIC PRESS: 743.3 MMHG
ATMOSPHERIC PRESS: 746.3 MMHG
BARBITURATES UR QL SCN: NEGATIVE
BASE EXCESS BLDCOA CALC-SCNC: -6.5 MMOL/L (ref -2–2)
BASE EXCESS BLDCOV CALC-SCNC: -3.5 MMOL/L (ref -30–30)
BENZODIAZ UR QL SCN: NEGATIVE
BLD GP AB SCN SERPL QL: NEGATIVE
BUPRENORPHINE SERPL-MCNC: NEGATIVE NG/ML
CANNABINOIDS SERPL QL: NEGATIVE
COCAINE UR QL: NEGATIVE
DEPRECATED RDW RBC AUTO: 40.6 FL (ref 37–54)
ERYTHROCYTE [DISTWIDTH] IN BLOOD BY AUTOMATED COUNT: 13.3 % (ref 12.3–15.4)
FENTANYL UR-MCNC: NEGATIVE NG/ML
HCO3 BLDCOA-SCNC: 23.9 MMOL/L
HCO3 BLDCOV-SCNC: 21.2 MMOL/L
HCT VFR BLD AUTO: 36 % (ref 34–46.6)
HGB BLD-MCNC: 12.6 G/DL (ref 12–15.9)
MCH RBC QN AUTO: 29.2 PG (ref 26.6–33)
MCHC RBC AUTO-ENTMCNC: 35 G/DL (ref 31.5–35.7)
MCV RBC AUTO: 83.3 FL (ref 79–97)
METHADONE UR QL SCN: NEGATIVE
OPIATES UR QL: NEGATIVE
OXYCODONE UR QL SCN: NEGATIVE
PCO2 BLDCOA: 65.2 MMHG (ref 33–49)
PCO2 BLDCOV: 36.9 MM HG (ref 35–51.3)
PCP UR QL SCN: NEGATIVE
PH BLDCOA: 7.17 PH UNITS (ref 7.18–7.34)
PH BLDCOV: 7.37 PH UNITS (ref 7.26–7.4)
PLATELET # BLD AUTO: 247 10*3/MM3 (ref 140–450)
PMV BLD AUTO: 9.2 FL (ref 6–12)
PO2 BLDCOA: 25.9 MMHG
PO2 BLDCOV: 28.7 MM HG (ref 19–39)
RBC # BLD AUTO: 4.32 10*6/MM3 (ref 3.77–5.28)
RH BLD: POSITIVE
SAO2 % BLDCOA: 32.6 %
SAO2 % BLDCOV: 52.9 %
T&S EXPIRATION DATE: NORMAL
TREPONEMA PALLIDUM IGG+IGM AB [PRESENCE] IN SERUM OR PLASMA BY IMMUNOASSAY: NORMAL
TRICYCLICS UR QL SCN: NEGATIVE
WBC NRBC COR # BLD AUTO: 8.16 10*3/MM3 (ref 3.4–10.8)

## 2024-10-23 PROCEDURE — 51702 INSERT TEMP BLADDER CATH: CPT

## 2024-10-23 PROCEDURE — 25010000002 FENTANYL CITRATE (PF) 50 MCG/ML SOLUTION: Performed by: REGISTERED NURSE

## 2024-10-23 PROCEDURE — 59400 OBSTETRICAL CARE: CPT | Performed by: OBSTETRICS & GYNECOLOGY

## 2024-10-23 PROCEDURE — 86850 RBC ANTIBODY SCREEN: CPT | Performed by: OBSTETRICS & GYNECOLOGY

## 2024-10-23 PROCEDURE — C1755 CATHETER, INTRASPINAL: HCPCS | Performed by: REGISTERED NURSE

## 2024-10-23 PROCEDURE — 86901 BLOOD TYPING SEROLOGIC RH(D): CPT | Performed by: OBSTETRICS & GYNECOLOGY

## 2024-10-23 PROCEDURE — 86900 BLOOD TYPING SEROLOGIC ABO: CPT | Performed by: OBSTETRICS & GYNECOLOGY

## 2024-10-23 PROCEDURE — 25010000002 LIDOCAINE PF 2% 2 % SOLUTION: Performed by: REGISTERED NURSE

## 2024-10-23 PROCEDURE — 25010000002 OXYTOCIN PER 10 UNITS: Performed by: OBSTETRICS & GYNECOLOGY

## 2024-10-23 PROCEDURE — 3E033VJ INTRODUCTION OF OTHER HORMONE INTO PERIPHERAL VEIN, PERCUTANEOUS APPROACH: ICD-10-PCS | Performed by: OBSTETRICS & GYNECOLOGY

## 2024-10-23 PROCEDURE — 0UQGXZZ REPAIR VAGINA, EXTERNAL APPROACH: ICD-10-PCS | Performed by: OBSTETRICS & GYNECOLOGY

## 2024-10-23 PROCEDURE — 25810000003 SODIUM CHLORIDE 0.9 % SOLUTION: Performed by: OBSTETRICS & GYNECOLOGY

## 2024-10-23 PROCEDURE — 82803 BLOOD GASES ANY COMBINATION: CPT

## 2024-10-23 PROCEDURE — 80307 DRUG TEST PRSMV CHEM ANLYZR: CPT | Performed by: OBSTETRICS & GYNECOLOGY

## 2024-10-23 PROCEDURE — 25810000003 LACTATED RINGERS SOLUTION: Performed by: OBSTETRICS & GYNECOLOGY

## 2024-10-23 PROCEDURE — 86780 TREPONEMA PALLIDUM: CPT | Performed by: OBSTETRICS & GYNECOLOGY

## 2024-10-23 PROCEDURE — 85027 COMPLETE CBC AUTOMATED: CPT | Performed by: OBSTETRICS & GYNECOLOGY

## 2024-10-23 PROCEDURE — 25810000003 LACTATED RINGERS PER 1000 ML: Performed by: OBSTETRICS & GYNECOLOGY

## 2024-10-23 PROCEDURE — 10907ZC DRAINAGE OF AMNIOTIC FLUID, THERAPEUTIC FROM PRODUCTS OF CONCEPTION, VIA NATURAL OR ARTIFICIAL OPENING: ICD-10-PCS | Performed by: OBSTETRICS & GYNECOLOGY

## 2024-10-23 RX ORDER — ONDANSETRON 2 MG/ML
4 INJECTION INTRAMUSCULAR; INTRAVENOUS EVERY 6 HOURS PRN
Status: DISCONTINUED | OUTPATIENT
Start: 2024-10-23 | End: 2024-10-23 | Stop reason: HOSPADM

## 2024-10-23 RX ORDER — FAMOTIDINE 10 MG/ML
20 INJECTION, SOLUTION INTRAVENOUS ONCE AS NEEDED
Status: DISCONTINUED | OUTPATIENT
Start: 2024-10-23 | End: 2024-10-23 | Stop reason: HOSPADM

## 2024-10-23 RX ORDER — OXYTOCIN/0.9 % SODIUM CHLORIDE 30/500 ML
1-16 PLASTIC BAG, INJECTION (ML) INTRAVENOUS
Status: DISCONTINUED | OUTPATIENT
Start: 2024-10-23 | End: 2024-10-23 | Stop reason: HOSPADM

## 2024-10-23 RX ORDER — ACETAMINOPHEN 325 MG/1
650 TABLET ORAL EVERY 6 HOURS
Status: DISCONTINUED | OUTPATIENT
Start: 2024-10-23 | End: 2024-10-24 | Stop reason: HOSPADM

## 2024-10-23 RX ORDER — ACETAMINOPHEN 325 MG/1
650 TABLET ORAL ONCE AS NEEDED
Status: DISCONTINUED | OUTPATIENT
Start: 2024-10-23 | End: 2024-10-23 | Stop reason: HOSPADM

## 2024-10-23 RX ORDER — SODIUM CHLORIDE, SODIUM LACTATE, POTASSIUM CHLORIDE, CALCIUM CHLORIDE 600; 310; 30; 20 MG/100ML; MG/100ML; MG/100ML; MG/100ML
125 INJECTION, SOLUTION INTRAVENOUS CONTINUOUS
Status: DISCONTINUED | OUTPATIENT
Start: 2024-10-23 | End: 2024-10-23

## 2024-10-23 RX ORDER — ONDANSETRON 2 MG/ML
4 INJECTION INTRAMUSCULAR; INTRAVENOUS ONCE AS NEEDED
Status: DISCONTINUED | OUTPATIENT
Start: 2024-10-23 | End: 2024-10-23 | Stop reason: HOSPADM

## 2024-10-23 RX ORDER — FAMOTIDINE 10 MG/ML
20 INJECTION, SOLUTION INTRAVENOUS 2 TIMES DAILY PRN
Status: DISCONTINUED | OUTPATIENT
Start: 2024-10-23 | End: 2024-10-23 | Stop reason: HOSPADM

## 2024-10-23 RX ORDER — FAMOTIDINE 20 MG/1
20 TABLET, FILM COATED ORAL 2 TIMES DAILY PRN
Status: DISCONTINUED | OUTPATIENT
Start: 2024-10-23 | End: 2024-10-23 | Stop reason: HOSPADM

## 2024-10-23 RX ORDER — LIDOCAINE HCL/EPINEPHRINE/PF 2%-1:200K
VIAL (ML) INJECTION
Status: DISPENSED
Start: 2024-10-23 | End: 2024-10-23

## 2024-10-23 RX ORDER — OXYTOCIN/0.9 % SODIUM CHLORIDE 30/500 ML
250 PLASTIC BAG, INJECTION (ML) INTRAVENOUS CONTINUOUS
Status: ACTIVE | OUTPATIENT
Start: 2024-10-23 | End: 2024-10-23

## 2024-10-23 RX ORDER — SODIUM CHLORIDE 9 MG/ML
40 INJECTION, SOLUTION INTRAVENOUS AS NEEDED
Status: DISCONTINUED | OUTPATIENT
Start: 2024-10-23 | End: 2024-10-23 | Stop reason: HOSPADM

## 2024-10-23 RX ORDER — PROMETHAZINE HYDROCHLORIDE 25 MG/1
25 TABLET ORAL EVERY 6 HOURS PRN
Status: DISCONTINUED | OUTPATIENT
Start: 2024-10-23 | End: 2024-10-23 | Stop reason: HOSPADM

## 2024-10-23 RX ORDER — SODIUM CHLORIDE 0.9 % (FLUSH) 0.9 %
10 SYRINGE (ML) INJECTION EVERY 12 HOURS SCHEDULED
Status: DISCONTINUED | OUTPATIENT
Start: 2024-10-23 | End: 2024-10-23 | Stop reason: HOSPADM

## 2024-10-23 RX ORDER — METHYLERGONOVINE MALEATE 0.2 MG/ML
200 INJECTION INTRAVENOUS ONCE AS NEEDED
Status: DISCONTINUED | OUTPATIENT
Start: 2024-10-23 | End: 2024-10-23 | Stop reason: HOSPADM

## 2024-10-23 RX ORDER — OXYTOCIN/0.9 % SODIUM CHLORIDE 30/500 ML
125 PLASTIC BAG, INJECTION (ML) INTRAVENOUS CONTINUOUS PRN
Status: DISCONTINUED | OUTPATIENT
Start: 2024-10-23 | End: 2024-10-24 | Stop reason: HOSPADM

## 2024-10-23 RX ORDER — OXYTOCIN/0.9 % SODIUM CHLORIDE 30/500 ML
1-16 PLASTIC BAG, INJECTION (ML) INTRAVENOUS
Status: DISCONTINUED | OUTPATIENT
Start: 2024-10-23 | End: 2024-10-23

## 2024-10-23 RX ORDER — HYDROCODONE BITARTRATE AND ACETAMINOPHEN 10; 325 MG/1; MG/1
1 TABLET ORAL EVERY 4 HOURS PRN
Status: DISCONTINUED | OUTPATIENT
Start: 2024-10-23 | End: 2024-10-23 | Stop reason: HOSPADM

## 2024-10-23 RX ORDER — ONDANSETRON 4 MG/1
4 TABLET, ORALLY DISINTEGRATING ORAL EVERY 6 HOURS PRN
Status: DISCONTINUED | OUTPATIENT
Start: 2024-10-23 | End: 2024-10-23 | Stop reason: HOSPADM

## 2024-10-23 RX ORDER — TERBUTALINE SULFATE 1 MG/ML
0.25 INJECTION, SOLUTION SUBCUTANEOUS AS NEEDED
Status: DISCONTINUED | OUTPATIENT
Start: 2024-10-23 | End: 2024-10-23 | Stop reason: HOSPADM

## 2024-10-23 RX ORDER — SODIUM CHLORIDE 0.9 % (FLUSH) 0.9 %
10 SYRINGE (ML) INJECTION AS NEEDED
Status: DISCONTINUED | OUTPATIENT
Start: 2024-10-23 | End: 2024-10-23 | Stop reason: HOSPADM

## 2024-10-23 RX ORDER — IBUPROFEN 600 MG/1
600 TABLET, FILM COATED ORAL EVERY 6 HOURS SCHEDULED
Status: DISCONTINUED | OUTPATIENT
Start: 2024-10-23 | End: 2024-10-24 | Stop reason: HOSPADM

## 2024-10-23 RX ORDER — MAGNESIUM CARB/ALUMINUM HYDROX 105-160MG
30 TABLET,CHEWABLE ORAL ONCE
Status: DISCONTINUED | OUTPATIENT
Start: 2024-10-23 | End: 2024-10-23 | Stop reason: HOSPADM

## 2024-10-23 RX ORDER — CALCIUM CARBONATE 500 MG/1
2 TABLET, CHEWABLE ORAL 3 TIMES DAILY PRN
Status: DISCONTINUED | OUTPATIENT
Start: 2024-10-23 | End: 2024-10-24 | Stop reason: HOSPADM

## 2024-10-23 RX ORDER — POLYETHYLENE GLYCOL 3350 17 G/17G
17 POWDER, FOR SOLUTION ORAL DAILY PRN
Status: DISCONTINUED | OUTPATIENT
Start: 2024-10-23 | End: 2024-10-24 | Stop reason: HOSPADM

## 2024-10-23 RX ORDER — LIDOCAINE HYDROCHLORIDE 10 MG/ML
0.5 INJECTION, SOLUTION EPIDURAL; INFILTRATION; INTRACAUDAL; PERINEURAL ONCE AS NEEDED
Status: DISCONTINUED | OUTPATIENT
Start: 2024-10-23 | End: 2024-10-23 | Stop reason: HOSPADM

## 2024-10-23 RX ORDER — ACETAMINOPHEN 325 MG/1
650 TABLET ORAL EVERY 4 HOURS PRN
Status: DISCONTINUED | OUTPATIENT
Start: 2024-10-23 | End: 2024-10-23 | Stop reason: HOSPADM

## 2024-10-23 RX ORDER — FENTANYL/ROPIVACAINE/NS/PF 2MCG/ML-.2
PLASTIC BAG, INJECTION (ML) INJECTION
Status: COMPLETED
Start: 2024-10-23 | End: 2024-10-23

## 2024-10-23 RX ORDER — HYDROCODONE BITARTRATE AND ACETAMINOPHEN 5; 325 MG/1; MG/1
1 TABLET ORAL EVERY 4 HOURS PRN
Status: DISCONTINUED | OUTPATIENT
Start: 2024-10-23 | End: 2024-10-23 | Stop reason: HOSPADM

## 2024-10-23 RX ORDER — MISOPROSTOL 200 UG/1
800 TABLET ORAL AS NEEDED
Status: DISCONTINUED | OUTPATIENT
Start: 2024-10-23 | End: 2024-10-23 | Stop reason: HOSPADM

## 2024-10-23 RX ORDER — SODIUM CHLORIDE 0.9 % (FLUSH) 0.9 %
1-10 SYRINGE (ML) INJECTION AS NEEDED
Status: DISCONTINUED | OUTPATIENT
Start: 2024-10-23 | End: 2024-10-24 | Stop reason: HOSPADM

## 2024-10-23 RX ORDER — LIDOCAINE HYDROCHLORIDE 20 MG/ML
INJECTION, SOLUTION EPIDURAL; INFILTRATION; INTRACAUDAL; PERINEURAL
Status: COMPLETED
Start: 2024-10-23 | End: 2024-10-23

## 2024-10-23 RX ORDER — IBUPROFEN 800 MG/1
800 TABLET, FILM COATED ORAL ONCE AS NEEDED
Status: DISCONTINUED | OUTPATIENT
Start: 2024-10-23 | End: 2024-10-23 | Stop reason: HOSPADM

## 2024-10-23 RX ORDER — OXYTOCIN/0.9 % SODIUM CHLORIDE 30/500 ML
999 PLASTIC BAG, INJECTION (ML) INTRAVENOUS ONCE
Status: DISCONTINUED | OUTPATIENT
Start: 2024-10-23 | End: 2024-10-24 | Stop reason: HOSPADM

## 2024-10-23 RX ORDER — LIDOCAINE HYDROCHLORIDE 20 MG/ML
INJECTION, SOLUTION EPIDURAL; INFILTRATION; INTRACAUDAL; PERINEURAL
Status: COMPLETED | OUTPATIENT
Start: 2024-10-23 | End: 2024-10-23

## 2024-10-23 RX ORDER — FAMOTIDINE 20 MG/1
20 TABLET, FILM COATED ORAL ONCE AS NEEDED
Status: DISCONTINUED | OUTPATIENT
Start: 2024-10-23 | End: 2024-10-23 | Stop reason: HOSPADM

## 2024-10-23 RX ORDER — ONDANSETRON 4 MG/1
4 TABLET, ORALLY DISINTEGRATING ORAL EVERY 8 HOURS PRN
Status: DISCONTINUED | OUTPATIENT
Start: 2024-10-23 | End: 2024-10-24 | Stop reason: HOSPADM

## 2024-10-23 RX ORDER — EPHEDRINE SULFATE 50 MG/ML
5 INJECTION, SOLUTION INTRAVENOUS
Status: DISCONTINUED | OUTPATIENT
Start: 2024-10-23 | End: 2024-10-23 | Stop reason: HOSPADM

## 2024-10-23 RX ORDER — METOCLOPRAMIDE HYDROCHLORIDE 5 MG/ML
10 INJECTION INTRAMUSCULAR; INTRAVENOUS EVERY 6 HOURS PRN
Status: DISCONTINUED | OUTPATIENT
Start: 2024-10-23 | End: 2024-10-23 | Stop reason: HOSPADM

## 2024-10-23 RX ORDER — PROMETHAZINE HYDROCHLORIDE 25 MG/1
12.5 TABLET ORAL EVERY 4 HOURS PRN
Status: DISCONTINUED | OUTPATIENT
Start: 2024-10-23 | End: 2024-10-24 | Stop reason: HOSPADM

## 2024-10-23 RX ORDER — MORPHINE SULFATE 5 MG/ML
5 INJECTION, SOLUTION INTRAMUSCULAR; INTRAVENOUS
Status: DISCONTINUED | OUTPATIENT
Start: 2024-10-23 | End: 2024-10-23 | Stop reason: HOSPADM

## 2024-10-23 RX ORDER — FENTANYL CITRATE 50 UG/ML
INJECTION, SOLUTION INTRAMUSCULAR; INTRAVENOUS
Status: COMPLETED
Start: 2024-10-23 | End: 2024-10-23

## 2024-10-23 RX ORDER — PROMETHAZINE HYDROCHLORIDE 25 MG/1
12.5 TABLET ORAL EVERY 6 HOURS PRN
Status: DISCONTINUED | OUTPATIENT
Start: 2024-10-23 | End: 2024-10-23 | Stop reason: HOSPADM

## 2024-10-23 RX ORDER — TRAMADOL HYDROCHLORIDE 50 MG/1
50 TABLET ORAL EVERY 6 HOURS PRN
Status: DISCONTINUED | OUTPATIENT
Start: 2024-10-23 | End: 2024-10-24 | Stop reason: HOSPADM

## 2024-10-23 RX ORDER — LIDOCAINE HYDROCHLORIDE AND EPINEPHRINE 15; 5 MG/ML; UG/ML
INJECTION, SOLUTION EPIDURAL
Status: COMPLETED | OUTPATIENT
Start: 2024-10-23 | End: 2024-10-23

## 2024-10-23 RX ORDER — DOCUSATE SODIUM 100 MG/1
100 CAPSULE, LIQUID FILLED ORAL DAILY
Status: DISCONTINUED | OUTPATIENT
Start: 2024-10-23 | End: 2024-10-24 | Stop reason: HOSPADM

## 2024-10-23 RX ORDER — FENTANYL CITRATE 50 UG/ML
INJECTION, SOLUTION INTRAMUSCULAR; INTRAVENOUS
Status: COMPLETED | OUTPATIENT
Start: 2024-10-23 | End: 2024-10-23

## 2024-10-23 RX ADMIN — SODIUM CHLORIDE, POTASSIUM CHLORIDE, SODIUM LACTATE AND CALCIUM CHLORIDE 1000 ML: 600; 310; 30; 20 INJECTION, SOLUTION INTRAVENOUS at 11:40

## 2024-10-23 RX ADMIN — SODIUM CHLORIDE, POTASSIUM CHLORIDE, SODIUM LACTATE AND CALCIUM CHLORIDE 125 ML/HR: 600; 310; 30; 20 INJECTION, SOLUTION INTRAVENOUS at 09:15

## 2024-10-23 RX ADMIN — Medication: at 19:10

## 2024-10-23 RX ADMIN — IBUPROFEN 600 MG: 600 TABLET, FILM COATED ORAL at 19:11

## 2024-10-23 RX ADMIN — LIDOCAINE HYDROCHLORIDE AND EPINEPHRINE 3 ML: 15; 5 INJECTION, SOLUTION EPIDURAL at 11:46

## 2024-10-23 RX ADMIN — OXYTOCIN 6 MILLI-UNITS/MIN: 10 INJECTION, SOLUTION INTRAMUSCULAR; INTRAVENOUS at 12:05

## 2024-10-23 RX ADMIN — ACETAMINOPHEN 650 MG: 325 TABLET ORAL at 21:53

## 2024-10-23 RX ADMIN — Medication 10 ML/HR: at 11:52

## 2024-10-23 RX ADMIN — WITCH HAZEL: 500 SOLUTION RECTAL; TOPICAL at 19:11

## 2024-10-23 RX ADMIN — LIDOCAINE HYDROCHLORIDE 5 ML: 20 INJECTION, SOLUTION EPIDURAL; INFILTRATION; INTRACAUDAL; PERINEURAL at 11:51

## 2024-10-23 RX ADMIN — OXYTOCIN 2 MILLI-UNITS/MIN: 10 INJECTION, SOLUTION INTRAMUSCULAR; INTRAVENOUS at 09:13

## 2024-10-23 RX ADMIN — LIDOCAINE HYDROCHLORIDE AND EPINEPHRINE 2 ML: 15; 5 INJECTION, SOLUTION EPIDURAL at 11:51

## 2024-10-23 RX ADMIN — DOCUSATE SODIUM 100 MG: 100 CAPSULE, LIQUID FILLED ORAL at 19:11

## 2024-10-23 RX ADMIN — FENTANYL CITRATE 100 MCG: 50 INJECTION, SOLUTION INTRAMUSCULAR; INTRAVENOUS at 11:51

## 2024-10-23 NOTE — PROGRESS NOTES
OB Intrapartum Note    Subjective: No complaints    Objective:  Baseline: Normal 110-160 bpm  Variability:   Moderate/Normal (amplitude 6-25 bpm)  Accelerations: Present (32 weeks+) 15 x 15 bpm  Decelerations: None  Contractions:  Irritability    Cervical exam:    Dilation: 4cm    Effacement: 70%    Station: -2 well applied    Impression:    Category I  Reassuring fetus, AROM, Clear fluid    Plan:   Start pitocin  Continue to monitor  Epidural  Active labor positioning  I have discussed in detail with patient the current plan to include, but NLT, appropriate expectations for labor and delivery, to include possible length of time expected for delivery and hospital stay.  All questions have been answered to pts satisfaction and pt desires to proceed as noted.  Specific labor and delivery desires: FOB cut cord        Electronically signed by Virginia Rasheed DO, 10/23/24, 8:53 AM EDT.

## 2024-10-23 NOTE — L&D DELIVERY NOTE
VAGINAL DELIVERY NOTE          Date: 10/23/2024  Time:  3:30 PM  GA: 39w1d    Infant: male infant   3800 g (8 lb 6 oz)    APGARS: 7  @ 1 minute / 9  @ 5 minutes    Delivery: The patient progressed to complete, complete and pushed for 2 contractions to deliver a viable infant in cephalic presentation weighing the above weight and above Apgars.  There was a loose nuchal cord.  It was easily reduced over the fetal vertex.  The mouth and nares were bulb suctioned on the perineum.  The right anterior and left posterior shoulders were easily delivered without traction.  The remainder of the body delivered.  The infant was vigorous.  Delayed cord clamping for 60 seconds.  The cord was then clamped and cut.  Infant was placed on the maternal chest for recovery.  The placenta delivered intact with the assistance of fundal massage and maternal pushing efforts.      On full evaluation of the perineum, vagina, cervix and rectum a small repair was placed.  Vaginal introital abrasion, small amount of bleeding, 1 figure-of-eight stitch of 3-0 Monocryl.    External anal sphincter was intact.  200mcg of Cytotec was given orally and 200mcg given SL to assist with uterine tone.    Counts were correct.      Estimated Blood Loss: 200 mls    Complications: None         Electronically signed by Virginia Rasheed DO, 10/23/24, 4:02 PM EDT.       [facility]  [address] [place] [address] AVE  [place] [address]  Dept: [phone]  Dept Fax: [phone]  Loc: [phone]

## 2024-10-23 NOTE — ANESTHESIA PROCEDURE NOTES
Labor Epidural    Pre-sedation assessment completed: 10/23/2024 11:38 AM    Patient reassessed immediately prior to procedure    Patient location during procedure: OB  Start Time: 10/23/2024 11:40 AM  Stop Time: 10/23/2024 11:52 AM  Performed By  CRNA/CAA: Angie Potter CRNA  Preanesthetic Checklist  Completed: patient identified, IV checked, risks and benefits discussed, surgical consent, monitors and equipment checked, pre-op evaluation and timeout performed  Additional Notes  Epidural placed on first attempt without difficulty, no redirecting.  Prep:  Pt Position:sitting  Sterile Tech:cap, gloves, sterile barrier, mask and gown  Prep:chlorhexidine gluconate and isopropyl alcohol  Monitoring:blood pressure monitoring, continuous pulse oximetry and EKG  Epidural Block Procedure:  Approach:midline  Guidance:landmark technique and palpation technique  Location:L3-L4  Needle Type:Tuohy  Needle Gauge:17 G  Loss of Resistance Medium: air  Loss of Resistance: 7cm  Cath Depth at skin:12 cm  Paresthesia: none  Aspiration:negative  Test Dose:negative  Medication: lidocaine 1.5%-EPINEPHrine 1:200,000 (XYLOCAINE W/EPI) injection - Epidural   3 mL - 10/23/2024 11:46:00 AM   2 mL - 10/23/2024 11:51:00 AM  lidocaine PF 2% (XYLOCAINE) injection - Epidural   5 mL - 10/23/2024 11:51:00 AM  fentaNYL citrate (PF) (SUBLIMAZE) injection - Epidural   100 mcg - 10/23/2024 11:51:00 AM  Number of Attempts: 1  Post Assessment:  Dressing:occlusive dressing applied and secured with tape  Pt Tolerance:patient tolerated the procedure well with no apparent complications  Complications:no

## 2024-10-23 NOTE — DISCHARGE INSTRUCTIONS
DR. DASH'S POSTPARTUM DISCHARGE PRECAUTIONS and Answers to FAQs     NO SEX for SIX weeks.     NO TUB BATH or POOL for TWO week(s), shower only.  Sitz baths are fine.     STITCHES (if present):  wash them daily in the shower with soap and water (any type of soap is fine, it does not need to be antibacterial soap).  It is ok to gently put your finger in and around the vaginal area.  Look at your stitches (the ones on the outside) when you get home.  You will then know what is normal and can have a point of reference to compare it to if you start to have concerns.  REDNESS, PUS, increase in PAIN, FEVER or CHILLS are all reasons to be seen our office immediately.  Go to the ER, if it is after hours or a weekend.       VAGINAL BLEEDING:  may continue on and off over the next several weeks after delivery and may increase slightly once you go home.  You should not be bleeding more than 1 large pad soaked every hour or two.  Clots (even the size of a lemon or larger) may be normal as long as the bleeding is not heavy and the clots do not continue.       FEVER or CHILLS or NOT FEELING WELL: call our office.  If the office is closed, you need to be seen in acute care or ER.       CHEST PAIN or SHORTNESS OF BREATH/AIR: you need to GO TO THE NEAREST ER or CALL 911.      SWELLING: can increase over the next 7-10 days and then should slowly improve.  Your legs/ankles should be fairly similar in size.  A red, painful, hot, swollen leg (usually just one side) can be a sign of a blood clot and should be evaluated immediately.  Call our office.  If it is after hours or a weekend, you must be seen IMMEDIATELY IN THE ER.      ELEVATED BLOOD PRESSURE:  you need to contact us if you are having  persistent elevated BP systolic (top number) more than 155 or diastolic (bottom number) more than 95, or a headache (not relieved with rest, hydration or over the counter pain reliever), an increase in your swelling (usually hands and face),  changes in your vision (typically flashing white or black spots) or severe persistent pain in the location of the upper right side of your belly (under your right breast).  Call our office or go to ER if after hours or a weekend.     LACTATION QUESTIONS or CONCERNS?  Call [facility] Lactation Support [phone].     WORK and SCHOOL TIME OFF: depends on your specific delivery type, surrounding circumstances, and your work insurance/school rules.  If you have questions, please call Jory Gonzalez at [phone] (ext. [phone]).  Or email Jory at [email or web address]  They will assist in required paperwork for you and/or family members.      Any further QUESTIONS or CONCERNS, please call [facility] [place] at [phone].

## 2024-10-23 NOTE — PLAN OF CARE
Goal Outcome Evaluation:  Plan of Care Reviewed With: patient        Progress: improving  Outcome Evaluation: currently progressing toward goals. Up ad martina, ambulating without difficulty, voids without difficulty. Vitals, fundus and lochia wnl. No complaint of pain at this time.

## 2024-10-23 NOTE — DISCHARGE SUMMARY
OB Discharge Summary        Admit Date:  10/23/2024  Date of Delivery: 10/23/2024  Discharge Date: 10/24/24    Reason for Admission/Chief Complaint: Scheduled IOL    Final Diagnosis:  39+1, IOL,   Nuchal cord with compression  To do at FU: Routine postpartum      Antepartum:  Prenatal care is complicated by:  See above Final Diagnosis for list    Intrapartum/Delivery:  OB Surgeon:  Dr. Virginia Rasheed,   Anesthesia: Epidural  Delivery Type:   Perineum : Abrasion, vaginal, 1 figure-of-eight stitch 3-0 Monocryl  Feeding method: Breast    Infant: male infant; Josiah Hare    Weight: 3800 g (8 lb 6 oz)     APGARS: 7  @ 1 minute / 9  @ 5 minutes    Hospital Course/Significant Findings:  Patient arrived for scheduled IOL, 3 to 4 cm dilated.  Pitocin and AROM.  Delivered within 7 hours of arrival.  Uncomplicated  and PP      Results from last 7 days   Lab Units 10/23/24  0846   WBC 10*3/mm3 8.16   HEMOGLOBIN g/dL 12.6   HEMATOCRIT % 36.0   PLATELETS 10*3/mm3 247             Results from last 7 days   Lab Units 10/23/24  0846   TREPONEMA PALLIDUM AB TOTAL  Non-Reactive       Results for orders placed or performed during the hospital encounter of 10/23/24   CBC (No Diff)    Collection Time: 10/23/24  8:46 AM    Specimen: Blood   Result Value Ref Range    WBC 8.16 3.40 - 10.80 10*3/mm3    RBC 4.32 3.77 - 5.28 10*6/mm3    Hemoglobin 12.6 12.0 - 15.9 g/dL    Hematocrit 36.0 34.0 - 46.6 %    MCV 83.3 79.0 - 97.0 fL    MCH 29.2 26.6 - 33.0 pg    MCHC 35.0 31.5 - 35.7 g/dL    RDW 13.3 12.3 - 15.4 %    RDW-SD 40.6 37.0 - 54.0 fl    MPV 9.2 6.0 - 12.0 fL    Platelets 247 140 - 450 10*3/mm3   Treponema pallidum AB w/Reflex RPR    Collection Time: 10/23/24  8:46 AM    Specimen: Blood   Result Value Ref Range    Treponemal AB Total Non-Reactive Non-Reactive   Urine Drug Screen - Urine, Clean Catch    Collection Time: 10/23/24  8:46 AM    Specimen: Urine, Clean Catch   Result Value Ref Range    THC, Screen, Urine Negative  Negative    Phencyclidine (PCP), Urine Negative Negative    Cocaine Screen, Urine Negative Negative    Methamphetamine, Ur Negative Negative    Opiate Screen Negative Negative    Amphetamine Screen, Urine Negative Negative    Benzodiazepine Screen, Urine Negative Negative    Tricyclic Antidepressants Screen Negative Negative    Methadone Screen, Urine Negative Negative    Barbiturates Screen, Urine Negative Negative    Oxycodone Screen, Urine Negative Negative    Buprenorphine, Screen, Urine Negative Negative   Fentanyl, Urine - Urine, Clean Catch    Collection Time: 10/23/24  8:46 AM    Specimen: Urine, Clean Catch   Result Value Ref Range    Fentanyl, Urine Negative Negative   Type & Screen    Collection Time: 10/23/24  8:46 AM    Specimen: Blood   Result Value Ref Range    ABO Type A     RH type Positive     Antibody Screen Negative     T&S Expiration Date 10/26/2024 11:59:59 PM    Blood Gas, Venous, Cord    Collection Time: 10/23/24  3:49 PM    Specimen: Umbilical Cord; Cord Blood Venous   Result Value Ref Range    pH, Cord Venous 7.368 7.260 - 7.400 pH Units    pCO2, Cord Venous 36.9 35.0 - 51.3 mm Hg    pO2, Cord Venous 28.7 19.0 - 39.0 mm Hg    HCO3, Cord Venous 21.2 mmol/L    Base Excess, Cord Venous -3.5 -30.0 - 30.0 mmol/L    O2 Sat, Cord Venous 52.9 %    Barometric Pressure for Blood Gas 743.3000 mmHg   Blood Gas, Arterial, Cord    Collection Time: 10/23/24  3:49 PM    Specimen: Umbilical Cord; Cord Blood Arterial   Result Value Ref Range    pH, Cord Arterial 7.17 (L) 7.18 - 7.34 pH Units    pCO2, Cord Arterial 65.2 (H) 33.0 - 49.0 mmHg    pO2, Cord Arterial 25.9 mmHg    HCO3, Cord Arterial 23.9 mmol/L    Base Exc, Cord Arterial -6.5 (L) -2.0 - 2.0 mmol/L    O2 Sat, Cord Arterial 32.6 %    Barometric Pressure for Blood Gas 746.3000 mmHg         Discharge:         Discharge Medications        New Medications        Instructions Start Date   acetaminophen 325 MG tablet  Commonly known as: Tylenol   650 mg,  Oral, Every 6 Hours PRN      ibuprofen 600 MG tablet  Commonly known as: ADVIL,MOTRIN   600 mg, Oral, Every 6 Hours PRN             Continue These Medications        Instructions Start Date   EPINEPHrine 0.3 MG/0.3ML solution auto-injector injection  Commonly known as: EPIPEN   See Admin Instructions      prenatal (CLASSIC) vitamin 28-0.8 MG tablet tablet  Generic drug: prenatal vitamin   Daily             Stop These Medications      famotidine 20 MG tablet  Commonly known as: Pepcid                Disposition: Home  Diet: Regular    Activity: Pelvic rest 6 weeks    Condition at discharge: Good    Follow up with: Dr. Virginia Rasheed DO or provider of her choice    Follow up in: Routine postpartum check 3-4 weeks      Complications: None       Signature: Electronically signed by Virginia Rasheed DO, 10/24/24, 5:45 PM EDT.        [facility] AND DELIV[facility]  [address] [place] [address] [facility] FU[facility]ESEn[facility] [address]  Dept: [phone]  Dept Fax: [phone]  Loc: [phone]

## 2024-10-23 NOTE — ANESTHESIA PREPROCEDURE EVALUATION
Anesthesia Evaluation     Patient summary reviewed and Nursing notes reviewed   no history of anesthetic complications:   NPO Solid Status: > 8 hours  NPO Liquid Status: > 2 hours           Airway   Mallampati: II  TM distance: >3 FB  Dental - normal exam     Pulmonary - negative pulmonary ROS and normal exam   Cardiovascular - negative cardio ROS and normal exam  Exercise tolerance: good (4-7 METS)        Neuro/Psych- negative ROS  GI/Hepatic/Renal/Endo    (+) GERD well controlled    Musculoskeletal (-) negative ROS    Abdominal    Substance History - negative use     OB/GYN    (+) Pregnant, Preeclampsia, pregnancy induced hypertension        Other - negative ROS       ROS/Med Hx Other:                Anesthesia Plan    ASA 2     epidural       Anesthetic plan, risks, benefits, and alternatives have been provided, discussed and informed consent has been obtained with: patient.  Pre-procedure education provided  Plan discussed with CRNA.    CODE STATUS:    Code Status (Patient has no pulse and is not breathing): CPR (Attempt to Resuscitate)  Medical Interventions (Patient has pulse or is breathing): Full

## 2024-10-23 NOTE — PROGRESS NOTES
OB Intrapartum Note    Subjective: No complaints, Pain controlled, Comfortable with epidural    Objective:  Baseline: Normal 110-160 bpm  Variability:   Moderate/Normal (amplitude 6-25 bpm)  Accelerations: Present (32 weeks+) 15 x 15 bpm  Decelerations: None  Contractions:  Regular, q2min, Pitocin at 7milliunits/min    Cervical exam:    Dilation: 5cm    Effacement: 80%    Station: -1    Impression:    Category I  Reassuring fetus, Adequate progress, AROM, Clear fluid, Pain controlled    Plan:   Continue pitocin  Continue to monitor        Electronically signed by Virginia Rasheed DO, 10/23/24, 12:50 PM EDT.

## 2024-10-24 VITALS
BODY MASS INDEX: 32 KG/M2 | RESPIRATION RATE: 16 BRPM | OXYGEN SATURATION: 100 % | HEIGHT: 60 IN | SYSTOLIC BLOOD PRESSURE: 110 MMHG | WEIGHT: 163 LBS | HEART RATE: 71 BPM | TEMPERATURE: 98.2 F | DIASTOLIC BLOOD PRESSURE: 63 MMHG

## 2024-10-24 PROCEDURE — 0503F POSTPARTUM CARE VISIT: CPT | Performed by: OBSTETRICS & GYNECOLOGY

## 2024-10-24 RX ORDER — ACETAMINOPHEN 325 MG/1
650 TABLET ORAL EVERY 6 HOURS PRN
Qty: 30 TABLET | Refills: 1 | Status: SHIPPED | OUTPATIENT
Start: 2024-10-24

## 2024-10-24 RX ORDER — IBUPROFEN 600 MG/1
600 TABLET, FILM COATED ORAL EVERY 6 HOURS PRN
Qty: 30 TABLET | Refills: 0 | Status: SHIPPED | OUTPATIENT
Start: 2024-10-24

## 2024-10-24 RX ADMIN — Medication: at 12:30

## 2024-10-24 RX ADMIN — DOCUSATE SODIUM 100 MG: 100 CAPSULE, LIQUID FILLED ORAL at 08:32

## 2024-10-24 RX ADMIN — ACETAMINOPHEN 650 MG: 325 TABLET ORAL at 09:36

## 2024-10-24 RX ADMIN — ACETAMINOPHEN 650 MG: 325 TABLET ORAL at 17:39

## 2024-10-24 RX ADMIN — WITCH HAZEL: 500 SOLUTION RECTAL; TOPICAL at 12:30

## 2024-10-24 RX ADMIN — IBUPROFEN 600 MG: 600 TABLET, FILM COATED ORAL at 00:28

## 2024-10-24 RX ADMIN — IBUPROFEN 600 MG: 600 TABLET, FILM COATED ORAL at 05:55

## 2024-10-24 RX ADMIN — WITCH HAZEL: 500 SOLUTION RECTAL; TOPICAL at 17:41

## 2024-10-24 RX ADMIN — IBUPROFEN 600 MG: 600 TABLET, FILM COATED ORAL at 17:39

## 2024-10-24 RX ADMIN — IBUPROFEN 600 MG: 600 TABLET, FILM COATED ORAL at 12:30

## 2024-10-24 RX ADMIN — ACETAMINOPHEN 650 MG: 325 TABLET ORAL at 04:36

## 2024-10-24 NOTE — PROGRESS NOTES
PostPartum/PostOp PROGRESS NOTE        Subjective:  Patient has no complaints  Pain controlled  Urinating spontaneously  Lochia decreasing, no bleeding concerns  Desires DC home if baby Dc'd    Objective:     Temp:  [98 °F (36.7 °C)-98.6 °F (37 °C)] 98 °F (36.7 °C)  Heart Rate:  [] 83  Resp:  [16-18] 18  BP: ()/(45-91) 106/58  General- NAD, alert and oriented, appropriate  Psych- Normal mood, good memory  Cardiovascular/Respiratory- Not examined  Abdomen- Fundus firm, non tender and Fundus at U-1  Extremties/DTRs- Trace edema, bilaterally equal, no signs of DVT    Results from last 7 days   Lab Units 10/23/24  0846   WBC 10*3/mm3 8.16   HEMOGLOBIN g/dL 12.6   HEMATOCRIT % 36.0   PLATELETS 10*3/mm3 247                  Results from last 7 days   Lab Units 10/23/24  0846   TREPONEMA PALLIDUM AB TOTAL  Non-Reactive     Assessment:    Post-partum/postop Day:  1  It is unknown whether the patient is currently breastfeeding.    Desires DC    Plan:   Routine postpartum/postop care  Breast feeding support  Discharge home  DC meds reviewed  Follow up scheduled  PP/PO precautions given  OB Hospitalist will see pt tomorrow if not Dc'd               Electronically signed by Virginia Rasheed DO, 10/24/24, 6:42 AM EDT.

## 2024-10-24 NOTE — ANESTHESIA POSTPROCEDURE EVALUATION
Patient: Marisol Ku    Procedure Summary       Date: 10/23/24 Room / Location:     Anesthesia Start: 1138 Anesthesia Stop: 1530    Procedure: LABOR ANALGESIA Diagnosis:     Scheduled Providers:  Provider: Angie Potter CRNA    Anesthesia Type: epidural ASA Status: 2            Anesthesia Type: epidural    Vitals  Vitals Value Taken Time   /58 10/24/24 0435   Temp 36.7 °C (98 °F) 10/24/24 0435   Pulse 83 10/24/24 0435   Resp 18 10/24/24 0435   SpO2 100 % 10/23/24 1535   Vitals shown include unfiled device data.        Post Anesthesia Care and Evaluation    Patient location during evaluation: bedside  Patient participation: complete - patient participated  Level of consciousness: awake  Pain score: 0  Pain management: adequate    Airway patency: patent  Anesthetic complications: No anesthetic complications  PONV Status: controlled  Cardiovascular status: acceptable and stable  Respiratory status: acceptable  Hydration status: acceptable  Post Neuraxial Block status: Motor and sensory function returned to baseline and No signs or symptoms of PDPH

## 2024-10-24 NOTE — PLAN OF CARE
Goal Outcome Evaluation:  Plan of Care Reviewed With: patient        Progress: improving  Outcome Evaluation: pt performing tasks independently, bonding well with infant, vitals stable, pain well controlled

## 2024-10-24 NOTE — PLAN OF CARE
Goal Outcome Evaluation:           Progress: improving  Outcome Evaluation: Breastfeeding going well. VSS. FF. Scant rubra. Tolerating PO fluids and solids. Voiding without difficulty.

## 2025-01-21 ENCOUNTER — OFFICE VISIT (OUTPATIENT)
Dept: FAMILY MEDICINE CLINIC | Facility: CLINIC | Age: 30
End: 2025-01-21
Payer: COMMERCIAL

## 2025-01-21 VITALS
RESPIRATION RATE: 19 BRPM | BODY MASS INDEX: 28.9 KG/M2 | DIASTOLIC BLOOD PRESSURE: 68 MMHG | HEART RATE: 85 BPM | TEMPERATURE: 99.6 F | SYSTOLIC BLOOD PRESSURE: 114 MMHG | OXYGEN SATURATION: 98 % | WEIGHT: 147.2 LBS | HEIGHT: 60 IN

## 2025-01-21 DIAGNOSIS — Z13.220 SCREENING FOR LIPID DISORDERS: ICD-10-CM

## 2025-01-21 DIAGNOSIS — Z00.00 ANNUAL PHYSICAL EXAM: Primary | ICD-10-CM

## 2025-01-21 PROCEDURE — 99395 PREV VISIT EST AGE 18-39: CPT

## 2025-01-21 NOTE — PROGRESS NOTES
"Marisol Ku presents to [facility] who presents to the clinic for annual physical.      History of Present Illness  This is a 29-year-old female who presents to the clinic for annual physical.    Patient states that she is doing really well postpartum, states that she has now done having children, she has done well without any difficulty.  Her labor was uneventful and states that she is doing well now with her 3 children.  States she has not had any residual issues at all and has done well.    Patient also states otherwise she is doing really well, has never had a full panel of blood work done and thinks that it may be time just to get a good baseline, but otherwise that she has no acute complaints today.    Patient does refuse COVID-vaccine, but otherwise is up-to-date on other preventative screenings.    The following portions of the patient's history were personally reviewed and updated as appropriate: allergies, current medications, past medical history, past surgical history, past family history, and past social history.       Objective   Vital Signs:   /68   Pulse 85   Temp 99.6 °F (37.6 °C)   Resp 19   Ht 152.4 cm (60\")   Wt 66.8 kg (147 lb 3.2 oz)   SpO2 98%   BMI 28.75 kg/m²     Body mass index is 28.75 kg/m².    All labs, imaging, test results, and specialty provider notes reviewed with patient.     Physical Exam  Vitals reviewed.   Constitutional:       Appearance: Normal appearance.   Cardiovascular:      Rate and Rhythm: Normal rate and regular rhythm.      Pulses: Normal pulses.      Heart sounds: Normal heart sounds.   Pulmonary:      Effort: Pulmonary effort is normal.      Breath sounds: Normal breath sounds.   Neurological:      General: No focal deficit present.      Mental Status: She is alert and oriented to person, place, and time.                Assessment and Plan:  Diagnoses and all orders for this visit:    1. Annual physical exam " (Primary)  -     CBC Auto Differential; Future  -     Comprehensive Metabolic Panel; Future  -     TSH Rfx On Abnormal To Free T4; Future    2. Screening for lipid disorders  -     Lipid Panel; Future      Anticipatory Guidelines discussed with patient.     Discussed getting adequate exercise, reduced TV/electronic time, car safety including seat belt use, sexual activity (if applicable), and smoking/alcohol use (if applicable).    Follow Up:  Return in about 1 year (around 1/21/2026) for Annual physical.    Patient was given instructions and counseling regarding her condition or for health maintenance advice. Please see specific information pulled into the AVS if appropriate.